# Patient Record
Sex: FEMALE | Race: WHITE | Employment: FULL TIME | URBAN - METROPOLITAN AREA
[De-identification: names, ages, dates, MRNs, and addresses within clinical notes are randomized per-mention and may not be internally consistent; named-entity substitution may affect disease eponyms.]

---

## 2021-07-30 ENCOUNTER — PATIENT OUTREACH (OUTPATIENT)
Dept: CARE COORDINATION | Facility: CLINIC | Age: 36
End: 2021-07-30

## 2021-07-30 DIAGNOSIS — O99.341 OTHER MENTAL DISORDERS COMPLICATING PREGNANCY, FIRST TRIMESTER: Primary | ICD-10-CM

## 2021-07-30 SDOH — HEALTH STABILITY: PHYSICAL HEALTH: ON AVERAGE, HOW MANY MINUTES DO YOU ENGAGE IN EXERCISE AT THIS LEVEL?: 0 MIN

## 2021-07-30 SDOH — ECONOMIC STABILITY: TRANSPORTATION INSECURITY
IN THE PAST 12 MONTHS, HAS THE LACK OF TRANSPORTATION KEPT YOU FROM MEDICAL APPOINTMENTS OR FROM GETTING MEDICATIONS?: NO

## 2021-07-30 SDOH — ECONOMIC STABILITY: FOOD INSECURITY: WITHIN THE PAST 12 MONTHS, YOU WORRIED THAT YOUR FOOD WOULD RUN OUT BEFORE YOU GOT MONEY TO BUY MORE.: NEVER TRUE

## 2021-07-30 SDOH — HEALTH STABILITY: PHYSICAL HEALTH: ON AVERAGE, HOW MANY DAYS PER WEEK DO YOU ENGAGE IN MODERATE TO STRENUOUS EXERCISE (LIKE A BRISK WALK)?: 0 DAYS

## 2021-07-30 SDOH — ECONOMIC STABILITY: FOOD INSECURITY: WITHIN THE PAST 12 MONTHS, THE FOOD YOU BOUGHT JUST DIDN'T LAST AND YOU DIDN'T HAVE MONEY TO GET MORE.: NEVER TRUE

## 2021-07-30 SDOH — ECONOMIC STABILITY: TRANSPORTATION INSECURITY
IN THE PAST 12 MONTHS, HAS LACK OF TRANSPORTATION KEPT YOU FROM MEETINGS, WORK, OR FROM GETTING THINGS NEEDED FOR DAILY LIVING?: NO

## 2021-07-30 ASSESSMENT — SOCIAL DETERMINANTS OF HEALTH (SDOH): HOW HARD IS IT FOR YOU TO PAY FOR THE VERY BASICS LIKE FOOD, HOUSING, MEDICAL CARE, AND HEATING?: NOT HARD AT ALL

## 2021-07-30 ASSESSMENT — ACTIVITIES OF DAILY LIVING (ADL): DEPENDENT_IADLS:: INDEPENDENT

## 2021-08-24 ENCOUNTER — PATIENT OUTREACH (OUTPATIENT)
Dept: CARE COORDINATION | Facility: CLINIC | Age: 36
End: 2021-08-24

## 2021-10-18 ENCOUNTER — LAB (OUTPATIENT)
Dept: URGENT CARE | Facility: URGENT CARE | Age: 36
End: 2021-10-18
Attending: SPECIALIST
Payer: COMMERCIAL

## 2021-10-18 DIAGNOSIS — Z01.818 PREOPERATIVE EXAMINATION: ICD-10-CM

## 2021-10-18 LAB — SARS-COV-2 RNA RESP QL NAA+PROBE: NEGATIVE

## 2021-10-18 PROCEDURE — U0005 INFEC AGEN DETEC AMPLI PROBE: HCPCS

## 2021-10-18 PROCEDURE — U0003 INFECTIOUS AGENT DETECTION BY NUCLEIC ACID (DNA OR RNA); SEVERE ACUTE RESPIRATORY SYNDROME CORONAVIRUS 2 (SARS-COV-2) (CORONAVIRUS DISEASE [COVID-19]), AMPLIFIED PROBE TECHNIQUE, MAKING USE OF HIGH THROUGHPUT TECHNOLOGIES AS DESCRIBED BY CMS-2020-01-R: HCPCS

## 2021-10-21 ENCOUNTER — ANESTHESIA (OUTPATIENT)
Dept: OBGYN | Facility: CLINIC | Age: 36
End: 2021-10-21
Payer: COMMERCIAL

## 2021-10-21 ENCOUNTER — ANESTHESIA EVENT (OUTPATIENT)
Dept: OBGYN | Facility: CLINIC | Age: 36
End: 2021-10-21
Payer: COMMERCIAL

## 2021-10-21 ENCOUNTER — HOSPITAL ENCOUNTER (INPATIENT)
Facility: CLINIC | Age: 36
LOS: 3 days | Discharge: HOME | End: 2021-10-24
Attending: SPECIALIST | Admitting: SPECIALIST
Payer: COMMERCIAL

## 2021-10-21 DIAGNOSIS — O14.93 PRE-ECLAMPSIA IN THIRD TRIMESTER: ICD-10-CM

## 2021-10-21 PROBLEM — O14.90 PREECLAMPSIA: Status: ACTIVE | Noted: 2021-10-21

## 2021-10-21 LAB
ABO/RH(D): NORMAL
AMPHETAMINES UR QL SCN: NORMAL
ANTIBODY SCREEN: NEGATIVE
AST SERPL W P-5'-P-CCNC: 28 U/L (ref 0–45)
BARBITURATES UR QL: NORMAL
BASOPHILS # BLD AUTO: 0 10E3/UL (ref 0–0.2)
BASOPHILS NFR BLD AUTO: 0 %
BENZODIAZ UR QL: NORMAL
CANNABINOIDS UR QL SCN: NORMAL
COCAINE UR QL: NORMAL
CREAT SERPL-MCNC: 0.65 MG/DL (ref 0.52–1.04)
EOSINOPHIL # BLD AUTO: 0.2 10E3/UL (ref 0–0.7)
EOSINOPHIL NFR BLD AUTO: 1 %
ERYTHROCYTE [DISTWIDTH] IN BLOOD BY AUTOMATED COUNT: 13.8 % (ref 10–15)
GFR SERPL CREATININE-BSD FRML MDRD: >90 ML/MIN/1.73M2
HCT VFR BLD AUTO: 33.9 % (ref 35–47)
HGB BLD-MCNC: 11.2 G/DL (ref 11.7–15.7)
IMM GRANULOCYTES # BLD: 0.1 10E3/UL
IMM GRANULOCYTES NFR BLD: 1 %
LYMPHOCYTES # BLD AUTO: 2.2 10E3/UL (ref 0.8–5.3)
LYMPHOCYTES NFR BLD AUTO: 19 %
MCH RBC QN AUTO: 27.6 PG (ref 26.5–33)
MCHC RBC AUTO-ENTMCNC: 33 G/DL (ref 31.5–36.5)
MCV RBC AUTO: 84 FL (ref 78–100)
MONOCYTES # BLD AUTO: 0.8 10E3/UL (ref 0–1.3)
MONOCYTES NFR BLD AUTO: 7 %
NEUTROPHILS # BLD AUTO: 8.4 10E3/UL (ref 1.6–8.3)
NEUTROPHILS NFR BLD AUTO: 72 %
NRBC # BLD AUTO: 0 10E3/UL
NRBC BLD AUTO-RTO: 0 /100
OPIATES UR QL SCN: NORMAL
PCP UR QL SCN: NORMAL
PLATELET # BLD AUTO: 284 10E3/UL (ref 150–450)
PLATELET # BLD AUTO: 292 10E3/UL (ref 150–450)
RBC # BLD AUTO: 4.06 10E6/UL (ref 3.8–5.2)
SPECIMEN EXPIRATION DATE: NORMAL
T PALLIDUM AB SER QL: NONREACTIVE
WBC # BLD AUTO: 11.6 10E3/UL (ref 4–11)

## 2021-10-21 PROCEDURE — 85025 COMPLETE CBC W/AUTO DIFF WBC: CPT | Performed by: SPECIALIST

## 2021-10-21 PROCEDURE — 258N000003 HC RX IP 258 OP 636: Performed by: SPECIALIST

## 2021-10-21 PROCEDURE — 258N000003 HC RX IP 258 OP 636: Performed by: ANESTHESIOLOGY

## 2021-10-21 PROCEDURE — 999N000040 HC STATISTIC CONSULT NO CHARGE VASC ACCESS

## 2021-10-21 PROCEDURE — 250N000013 HC RX MED GY IP 250 OP 250 PS 637: Performed by: SPECIALIST

## 2021-10-21 PROCEDURE — 80307 DRUG TEST PRSMV CHEM ANLYZR: CPT | Performed by: SPECIALIST

## 2021-10-21 PROCEDURE — 250N000011 HC RX IP 250 OP 636: Performed by: SPECIALIST

## 2021-10-21 PROCEDURE — 86900 BLOOD TYPING SEROLOGIC ABO: CPT | Performed by: SPECIALIST

## 2021-10-21 PROCEDURE — 36415 COLL VENOUS BLD VENIPUNCTURE: CPT | Performed by: SPECIALIST

## 2021-10-21 PROCEDURE — 86780 TREPONEMA PALLIDUM: CPT | Performed by: SPECIALIST

## 2021-10-21 PROCEDURE — 370N000003 HC ANESTHESIA WARD SERVICE

## 2021-10-21 PROCEDURE — 250N000009 HC RX 250: Performed by: ANESTHESIOLOGY

## 2021-10-21 PROCEDURE — 120N000001 HC R&B MED SURG/OB

## 2021-10-21 PROCEDURE — 250N000009 HC RX 250: Performed by: SPECIALIST

## 2021-10-21 PROCEDURE — 82565 ASSAY OF CREATININE: CPT | Performed by: SPECIALIST

## 2021-10-21 PROCEDURE — 250N000013 HC RX MED GY IP 250 OP 250 PS 637

## 2021-10-21 PROCEDURE — 250N000011 HC RX IP 250 OP 636: Performed by: ANESTHESIOLOGY

## 2021-10-21 PROCEDURE — 85049 AUTOMATED PLATELET COUNT: CPT | Performed by: SPECIALIST

## 2021-10-21 PROCEDURE — 999N000128 HC STATISTIC PERIPHERAL IV START W/O US GUIDANCE

## 2021-10-21 PROCEDURE — 84450 TRANSFERASE (AST) (SGOT): CPT | Performed by: SPECIALIST

## 2021-10-21 RX ORDER — KETOROLAC TROMETHAMINE 30 MG/ML
30 INJECTION, SOLUTION INTRAMUSCULAR; INTRAVENOUS
Status: DISCONTINUED | OUTPATIENT
Start: 2021-10-21 | End: 2021-10-24 | Stop reason: HOSPADM

## 2021-10-21 RX ORDER — METOCLOPRAMIDE HYDROCHLORIDE 5 MG/ML
10 INJECTION INTRAMUSCULAR; INTRAVENOUS EVERY 6 HOURS PRN
Status: DISCONTINUED | OUTPATIENT
Start: 2021-10-21 | End: 2021-10-22 | Stop reason: HOSPADM

## 2021-10-21 RX ORDER — IBUPROFEN 600 MG/1
600 TABLET, FILM COATED ORAL
Status: DISCONTINUED | OUTPATIENT
Start: 2021-10-21 | End: 2021-10-24 | Stop reason: HOSPADM

## 2021-10-21 RX ORDER — FENTANYL CITRATE 50 UG/ML
100 INJECTION, SOLUTION INTRAMUSCULAR; INTRAVENOUS ONCE
Status: DISCONTINUED | OUTPATIENT
Start: 2021-10-21 | End: 2021-10-22 | Stop reason: HOSPADM

## 2021-10-21 RX ORDER — MISOPROSTOL 100 UG/1
25 TABLET ORAL EVERY 4 HOURS PRN
Status: DISCONTINUED | OUTPATIENT
Start: 2021-10-21 | End: 2021-10-22 | Stop reason: HOSPADM

## 2021-10-21 RX ORDER — FENTANYL CITRATE 50 UG/ML
INJECTION, SOLUTION INTRAMUSCULAR; INTRAVENOUS
Status: COMPLETED | OUTPATIENT
Start: 2021-10-21 | End: 2021-10-21

## 2021-10-21 RX ORDER — MAGNESIUM SULFATE HEPTAHYDRATE 40 MG/ML
2 INJECTION, SOLUTION INTRAVENOUS
Status: DISCONTINUED | OUTPATIENT
Start: 2021-10-21 | End: 2021-10-24 | Stop reason: HOSPADM

## 2021-10-21 RX ORDER — NALOXONE HYDROCHLORIDE 0.4 MG/ML
0.2 INJECTION, SOLUTION INTRAMUSCULAR; INTRAVENOUS; SUBCUTANEOUS
Status: DISCONTINUED | OUTPATIENT
Start: 2021-10-21 | End: 2021-10-22 | Stop reason: HOSPADM

## 2021-10-21 RX ORDER — ESCITALOPRAM OXALATE 20 MG/1
20 TABLET ORAL DAILY
Status: DISCONTINUED | OUTPATIENT
Start: 2021-10-21 | End: 2021-10-24 | Stop reason: HOSPADM

## 2021-10-21 RX ORDER — NIFEDIPINE 10 MG/1
10-20 CAPSULE ORAL
Status: DISCONTINUED | OUTPATIENT
Start: 2021-10-21 | End: 2021-10-24 | Stop reason: HOSPADM

## 2021-10-21 RX ORDER — NALOXONE HYDROCHLORIDE 0.4 MG/ML
0.4 INJECTION, SOLUTION INTRAMUSCULAR; INTRAVENOUS; SUBCUTANEOUS
Status: DISCONTINUED | OUTPATIENT
Start: 2021-10-21 | End: 2021-10-22 | Stop reason: HOSPADM

## 2021-10-21 RX ORDER — ACETAMINOPHEN 325 MG/1
650 TABLET ORAL EVERY 4 HOURS PRN
Status: DISCONTINUED | OUTPATIENT
Start: 2021-10-21 | End: 2021-10-24 | Stop reason: HOSPADM

## 2021-10-21 RX ORDER — PROCHLORPERAZINE MALEATE 5 MG
10 TABLET ORAL EVERY 6 HOURS PRN
Status: DISCONTINUED | OUTPATIENT
Start: 2021-10-21 | End: 2021-10-22 | Stop reason: HOSPADM

## 2021-10-21 RX ORDER — CARBOPROST TROMETHAMINE 250 UG/ML
250 INJECTION, SOLUTION INTRAMUSCULAR
Status: DISCONTINUED | OUTPATIENT
Start: 2021-10-21 | End: 2021-10-22 | Stop reason: HOSPADM

## 2021-10-21 RX ORDER — OXYTOCIN/0.9 % SODIUM CHLORIDE 30/500 ML
340 PLASTIC BAG, INJECTION (ML) INTRAVENOUS CONTINUOUS PRN
Status: DISCONTINUED | OUTPATIENT
Start: 2021-10-21 | End: 2021-10-22 | Stop reason: HOSPADM

## 2021-10-21 RX ORDER — ONDANSETRON 4 MG/1
4 TABLET, ORALLY DISINTEGRATING ORAL EVERY 6 HOURS PRN
Status: DISCONTINUED | OUTPATIENT
Start: 2021-10-21 | End: 2021-10-22 | Stop reason: HOSPADM

## 2021-10-21 RX ORDER — TERBUTALINE SULFATE 1 MG/ML
0.25 INJECTION, SOLUTION SUBCUTANEOUS
Status: DISCONTINUED | OUTPATIENT
Start: 2021-10-21 | End: 2021-10-22 | Stop reason: HOSPADM

## 2021-10-21 RX ORDER — OXYTOCIN 10 [USP'U]/ML
10 INJECTION, SOLUTION INTRAMUSCULAR; INTRAVENOUS
Status: DISCONTINUED | OUTPATIENT
Start: 2021-10-21 | End: 2021-10-24 | Stop reason: HOSPADM

## 2021-10-21 RX ORDER — ROPIVACAINE HYDROCHLORIDE 2 MG/ML
10 INJECTION, SOLUTION EPIDURAL; INFILTRATION; PERINEURAL ONCE
Status: DISCONTINUED | OUTPATIENT
Start: 2021-10-21 | End: 2021-10-22 | Stop reason: HOSPADM

## 2021-10-21 RX ORDER — METHYLERGONOVINE MALEATE 0.2 MG/ML
200 INJECTION INTRAVENOUS
Status: DISCONTINUED | OUTPATIENT
Start: 2021-10-21 | End: 2021-10-22 | Stop reason: HOSPADM

## 2021-10-21 RX ORDER — METOCLOPRAMIDE 10 MG/1
10 TABLET ORAL EVERY 6 HOURS PRN
Status: DISCONTINUED | OUTPATIENT
Start: 2021-10-21 | End: 2021-10-22 | Stop reason: HOSPADM

## 2021-10-21 RX ORDER — PENICILLIN G 3000000 [IU]/50ML
3 INJECTION, SOLUTION INTRAVENOUS EVERY 4 HOURS
Status: DISCONTINUED | OUTPATIENT
Start: 2021-10-21 | End: 2021-10-22 | Stop reason: HOSPADM

## 2021-10-21 RX ORDER — CALCIUM GLUCONATE 94 MG/ML
1 INJECTION, SOLUTION INTRAVENOUS
Status: DISCONTINUED | OUTPATIENT
Start: 2021-10-21 | End: 2021-10-24 | Stop reason: HOSPADM

## 2021-10-21 RX ORDER — PROCHLORPERAZINE 25 MG
25 SUPPOSITORY, RECTAL RECTAL EVERY 12 HOURS PRN
Status: DISCONTINUED | OUTPATIENT
Start: 2021-10-21 | End: 2021-10-22 | Stop reason: HOSPADM

## 2021-10-21 RX ORDER — LIDOCAINE 40 MG/G
CREAM TOPICAL
Status: DISCONTINUED | OUTPATIENT
Start: 2021-10-21 | End: 2021-10-22 | Stop reason: HOSPADM

## 2021-10-21 RX ORDER — OXYTOCIN 10 [USP'U]/ML
10 INJECTION, SOLUTION INTRAMUSCULAR; INTRAVENOUS
Status: DISCONTINUED | OUTPATIENT
Start: 2021-10-21 | End: 2021-10-22 | Stop reason: HOSPADM

## 2021-10-21 RX ORDER — ACETAMINOPHEN 325 MG/1
TABLET ORAL
Status: COMPLETED
Start: 2021-10-21 | End: 2021-10-21

## 2021-10-21 RX ORDER — PANTOPRAZOLE SODIUM 40 MG/1
40 TABLET, DELAYED RELEASE ORAL
Status: DISCONTINUED | OUTPATIENT
Start: 2021-10-21 | End: 2021-10-24 | Stop reason: HOSPADM

## 2021-10-21 RX ORDER — BUPROPION HYDROCHLORIDE 150 MG/1
150 TABLET ORAL DAILY
Status: DISCONTINUED | OUTPATIENT
Start: 2021-10-21 | End: 2021-10-24 | Stop reason: HOSPADM

## 2021-10-21 RX ORDER — OXYTOCIN/0.9 % SODIUM CHLORIDE 30/500 ML
1-24 PLASTIC BAG, INJECTION (ML) INTRAVENOUS CONTINUOUS
Status: DISCONTINUED | OUTPATIENT
Start: 2021-10-21 | End: 2021-10-22 | Stop reason: HOSPADM

## 2021-10-21 RX ORDER — PRENATAL VIT/IRON FUM/FOLIC AC 27MG-0.8MG
1 TABLET ORAL DAILY
COMMUNITY
End: 2022-01-19

## 2021-10-21 RX ORDER — PENICILLIN G POTASSIUM 5000000 [IU]/1
5 INJECTION, POWDER, FOR SOLUTION INTRAMUSCULAR; INTRAVENOUS ONCE
Status: COMPLETED | OUTPATIENT
Start: 2021-10-21 | End: 2021-10-21

## 2021-10-21 RX ORDER — MAGNESIUM SULFATE HEPTAHYDRATE 40 MG/ML
4 INJECTION, SOLUTION INTRAVENOUS ONCE
Status: COMPLETED | OUTPATIENT
Start: 2021-10-21 | End: 2021-10-21

## 2021-10-21 RX ORDER — OXYTOCIN/0.9 % SODIUM CHLORIDE 30/500 ML
100-340 PLASTIC BAG, INJECTION (ML) INTRAVENOUS CONTINUOUS PRN
Status: DISCONTINUED | OUTPATIENT
Start: 2021-10-21 | End: 2021-10-24 | Stop reason: HOSPADM

## 2021-10-21 RX ORDER — FENTANYL CITRATE 50 UG/ML
50-100 INJECTION, SOLUTION INTRAMUSCULAR; INTRAVENOUS
Status: DISCONTINUED | OUTPATIENT
Start: 2021-10-21 | End: 2021-10-22 | Stop reason: HOSPADM

## 2021-10-21 RX ORDER — MAGNESIUM SULFATE IN WATER 40 MG/ML
2 INJECTION, SOLUTION INTRAVENOUS CONTINUOUS
Status: DISCONTINUED | OUTPATIENT
Start: 2021-10-21 | End: 2021-10-24 | Stop reason: HOSPADM

## 2021-10-21 RX ORDER — MAGNESIUM SULFATE HEPTAHYDRATE 40 MG/ML
4 INJECTION, SOLUTION INTRAVENOUS
Status: DISCONTINUED | OUTPATIENT
Start: 2021-10-21 | End: 2021-10-24 | Stop reason: HOSPADM

## 2021-10-21 RX ORDER — ONDANSETRON 2 MG/ML
4 INJECTION INTRAMUSCULAR; INTRAVENOUS EVERY 6 HOURS PRN
Status: DISCONTINUED | OUTPATIENT
Start: 2021-10-21 | End: 2021-10-22 | Stop reason: HOSPADM

## 2021-10-21 RX ORDER — MISOPROSTOL 200 UG/1
400 TABLET ORAL
Status: DISCONTINUED | OUTPATIENT
Start: 2021-10-21 | End: 2021-10-22 | Stop reason: HOSPADM

## 2021-10-21 RX ORDER — ROPIVACAINE HYDROCHLORIDE 2 MG/ML
INJECTION, SOLUTION EPIDURAL; INFILTRATION; PERINEURAL
Status: COMPLETED | OUTPATIENT
Start: 2021-10-21 | End: 2021-10-21

## 2021-10-21 RX ORDER — MISOPROSTOL 200 UG/1
800 TABLET ORAL
Status: DISCONTINUED | OUTPATIENT
Start: 2021-10-21 | End: 2021-10-22 | Stop reason: HOSPADM

## 2021-10-21 RX ORDER — SODIUM CHLORIDE, SODIUM LACTATE, POTASSIUM CHLORIDE, CALCIUM CHLORIDE 600; 310; 30; 20 MG/100ML; MG/100ML; MG/100ML; MG/100ML
10-125 INJECTION, SOLUTION INTRAVENOUS CONTINUOUS
Status: DISCONTINUED | OUTPATIENT
Start: 2021-10-21 | End: 2021-10-24 | Stop reason: HOSPADM

## 2021-10-21 RX ORDER — EPHEDRINE SULFATE 50 MG/ML
5 INJECTION, SOLUTION INTRAMUSCULAR; INTRAVENOUS; SUBCUTANEOUS
Status: DISCONTINUED | OUTPATIENT
Start: 2021-10-21 | End: 2021-10-22 | Stop reason: HOSPADM

## 2021-10-21 RX ORDER — NALBUPHINE HYDROCHLORIDE 10 MG/ML
2.5-5 INJECTION, SOLUTION INTRAMUSCULAR; INTRAVENOUS; SUBCUTANEOUS EVERY 6 HOURS PRN
Status: DISCONTINUED | OUTPATIENT
Start: 2021-10-21 | End: 2021-10-24 | Stop reason: HOSPADM

## 2021-10-21 RX ORDER — TRANEXAMIC ACID 10 MG/ML
1 INJECTION, SOLUTION INTRAVENOUS EVERY 30 MIN PRN
Status: DISCONTINUED | OUTPATIENT
Start: 2021-10-21 | End: 2021-10-22 | Stop reason: HOSPADM

## 2021-10-21 RX ORDER — MAGNESIUM SULFATE HEPTAHYDRATE 500 MG/ML
10 INJECTION, SOLUTION INTRAMUSCULAR; INTRAVENOUS
Status: DISCONTINUED | OUTPATIENT
Start: 2021-10-21 | End: 2021-10-24 | Stop reason: HOSPADM

## 2021-10-21 RX ORDER — LORAZEPAM 2 MG/ML
2 INJECTION INTRAMUSCULAR
Status: DISCONTINUED | OUTPATIENT
Start: 2021-10-21 | End: 2021-10-24 | Stop reason: HOSPADM

## 2021-10-21 RX ORDER — LABETALOL HYDROCHLORIDE 5 MG/ML
20 INJECTION, SOLUTION INTRAVENOUS
Status: COMPLETED | OUTPATIENT
Start: 2021-10-21 | End: 2021-10-21

## 2021-10-21 RX ADMIN — PANTOPRAZOLE SODIUM 40 MG: 40 TABLET, DELAYED RELEASE ORAL at 09:46

## 2021-10-21 RX ADMIN — ESCITALOPRAM OXALATE 20 MG: 20 TABLET ORAL at 09:46

## 2021-10-21 RX ADMIN — NIFEDIPINE 10 MG: 10 CAPSULE, LIQUID FILLED ORAL at 09:00

## 2021-10-21 RX ADMIN — ROPIVACAINE HYDROCHLORIDE 10 ML: 2 INJECTION, SOLUTION EPIDURAL; INFILTRATION at 19:45

## 2021-10-21 RX ADMIN — FENTANYL CITRATE 100 MCG: 50 INJECTION, SOLUTION INTRAMUSCULAR; INTRAVENOUS at 19:45

## 2021-10-21 RX ADMIN — MAGNESIUM SULFATE HEPTAHYDRATE 2 G/HR: 40 INJECTION, SOLUTION INTRAVENOUS at 14:49

## 2021-10-21 RX ADMIN — PENICILLIN G 3 MILLION UNITS: 3000000 INJECTION, SOLUTION INTRAVENOUS at 13:26

## 2021-10-21 RX ADMIN — FENTANYL CITRATE: 50 INJECTION, SOLUTION INTRAMUSCULAR; INTRAVENOUS at 19:44

## 2021-10-21 RX ADMIN — MISOPROSTOL 25 MCG: 100 TABLET ORAL at 09:09

## 2021-10-21 RX ADMIN — Medication 5 MG: at 19:56

## 2021-10-21 RX ADMIN — SODIUM CHLORIDE, POTASSIUM CHLORIDE, SODIUM LACTATE AND CALCIUM CHLORIDE 125 ML/HR: 600; 310; 30; 20 INJECTION, SOLUTION INTRAVENOUS at 13:26

## 2021-10-21 RX ADMIN — LABETALOL HYDROCHLORIDE 20 MG: 5 INJECTION, SOLUTION INTRAVENOUS at 18:56

## 2021-10-21 RX ADMIN — ACETAMINOPHEN 650 MG: 325 TABLET, FILM COATED ORAL at 14:18

## 2021-10-21 RX ADMIN — Medication 5 MG: at 21:17

## 2021-10-21 RX ADMIN — Medication 2 MILLI-UNITS/MIN: at 18:39

## 2021-10-21 RX ADMIN — NIFEDIPINE 10 MG: 10 CAPSULE, LIQUID FILLED ORAL at 13:15

## 2021-10-21 RX ADMIN — FENTANYL CITRATE 10 ML/HR: 50 INJECTION, SOLUTION INTRAMUSCULAR; INTRAVENOUS at 21:05

## 2021-10-21 RX ADMIN — MAGNESIUM SULFATE HEPTAHYDRATE 2 G/HR: 40 INJECTION, SOLUTION INTRAVENOUS at 15:57

## 2021-10-21 RX ADMIN — NIFEDIPINE 10 MG: 10 CAPSULE, LIQUID FILLED ORAL at 15:47

## 2021-10-21 RX ADMIN — MAGNESIUM SULFATE HEPTAHYDRATE 4 G: 40 INJECTION, SOLUTION INTRAVENOUS at 14:19

## 2021-10-21 RX ADMIN — PENICILLIN G POTASSIUM 5 MILLION UNITS: 5000000 POWDER, FOR SOLUTION INTRAMUSCULAR; INTRAPLEURAL; INTRATHECAL; INTRAVENOUS at 17:19

## 2021-10-21 RX ADMIN — SODIUM CHLORIDE, POTASSIUM CHLORIDE, SODIUM LACTATE AND CALCIUM CHLORIDE 75 ML/HR: 600; 310; 30; 20 INJECTION, SOLUTION INTRAVENOUS at 23:34

## 2021-10-21 RX ADMIN — ONDANSETRON 4 MG: 2 INJECTION INTRAMUSCULAR; INTRAVENOUS at 21:13

## 2021-10-21 RX ADMIN — ACETAMINOPHEN 650 MG: 325 TABLET, FILM COATED ORAL at 10:24

## 2021-10-21 RX ADMIN — PENICILLIN G 3 MILLION UNITS: 3000000 INJECTION, SOLUTION INTRAVENOUS at 21:36

## 2021-10-21 RX ADMIN — MISOPROSTOL 25 MCG: 100 TABLET ORAL at 13:07

## 2021-10-21 RX ADMIN — ACETAMINOPHEN 650 MG: 325 TABLET, FILM COATED ORAL at 18:42

## 2021-10-21 RX ADMIN — ACETAMINOPHEN 650 MG: 325 TABLET, FILM COATED ORAL at 23:42

## 2021-10-21 RX ADMIN — BUPROPION HYDROCHLORIDE 150 MG: 150 TABLET, EXTENDED RELEASE ORAL at 09:46

## 2021-10-21 ASSESSMENT — MIFFLIN-ST. JEOR: SCORE: 1549.98

## 2021-10-21 NOTE — PROGRESS NOTES
"OB progress  Catarina Crews    EVENTS:   had several elevated BPs and sever headache so mag sulfate started at  230pm.  Treated with 10mg nifedipine 10mg  at 9, 1315 and 1547.   2nd Cytotec placed at ~1pm, pcn started at that time as well, just received 2nd dose.      S: headache slightly better, rating an \"8.\" Feeling contractions starting to feel them more but still tolearting.O:    O:   Patient Vitals for the past 8 hrs:   BP Temp Temp src Resp SpO2 Height Weight   625pm 149/84         10/21/21 1735 (!) 157 -- -- 16 -- -- --   10/21/21 1718 (!)  -- -- 16 -- -- --   10/21/21 1704 (!) 158 -- -- 16 98 % -- --   10/21/21 1647 (!) 148/76 -- -- -- -- -- --   10/21/21 1629 (!) 164/78 -- -- -- -- -- --   10/21/21 1608 (!) 145/82 -- -- 16 -- -- --   10/21/21 1538 (!) 166/87 *treated  -- -- 16 98 % -- --   10/21/21 1522 (!) 164/86 -- -- 16 97 % -- --   10/21/21 1452 (!) 157/77 -- -- 16 97 % -- --   10/21/21 1449 (!) 156/81 -- -- 16 97 % -- --   10/21/21 1444 (!) 154/82 -- -- 16 96 % -- --   10/21/21 1440 (!) 150/80 -- -- 16 98 % -- --   10/21/21 1434 (!) 148/79 -- -- 16 98 % -- --   10/21/21 1430 (!) 158/81 98.1  F (36.7  C) Temporal -- 98 % -- --   10/21/21 1429 (!) 158/ -- -- 16 100 % -- --   10/21/21 1424 (!) 168/83 -- -- 16 -- 1.524 m (5') 94.3 kg (208 lb)   10/21/21 1417 (!) 146/79 -- -- 16 -- -- --   10/21/21 1357 (!) 155/83 -- -- 16 -- -- --   10/21/21 1338 (!) 157/89 -- -- 16 -- -- --   10/21/21 1313 (!) 180/100  *treated -- -- 16 -- -- --   10/21/21 1256 (!) 163/87 97.5  F (36.4  C) Temporal 16 -- -- --     AAOxe, initially laying with washcloth over eyes. But overall appears well  Cervix /-2 soft anterior  AROM at 625pm, copious clear fluid  FHT: 155, moderate variability, occ variable decel  TOCO q4-6 minutes    plt 292  hgb 11.2  Cr 0.65  AST 28      A: 36 year old  @ 37w1d IOL for pre-eclampsia, now with sever features of headsche snd sever range bps today.  Has ripened cervix some " today, now s/p AROM  GBS +, s/p 2 doses pcn    P: will start pitocin  Plans epidural soon  Cont to treat Ps >160/100    Electronically signed by  Bia Retana DO  6:34 PM  10/21/2021  [facility]

## 2021-10-21 NOTE — PROVIDER NOTIFICATION
"Dr Retana paged, \"Patient c/o headache. Would you be willing to order Tylenol prn? Thanks so much!\"    Return call from Dr Retana, orders received.   "
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
## 2021-10-21 NOTE — PROVIDER NOTIFICATION
"Dr Retana paged with update, \"163/87 and 180/100, oral procardia given. Recheck BP post treatment in 5 minutes. SVE with cytotec placement 1-2/50/-3. Thanks! Tina GARCIA\". Awaiting return call/new orders.     Addendum: Dr Retana paged with update post treatment /89.    Addendum 1404: return call from Dr Retana. Will start treatment with magnesium now with 4 gm load along with 2 gm per hour, TORB. Dr Retana planning on checking pt after clinic.   "

## 2021-10-21 NOTE — PROVIDER NOTIFICATION
Dr Retana paged to discuss BP medication treatment in the setting of severe range BPs. Awaiting return call.     MD updated on BPs. Pt given procardia. Will monitor BP and give labetalol if BP continues to be in severe range BP.

## 2021-10-21 NOTE — H&P
OB ADMIT  Catarina Crews    CC: pre-eclampsia without severe features  HPI: Catarina Crews is a 36 year old  @ 37+1 weeks by 6+6w US presents for labor induction due to pre-elclampsia without severe features. She's had gestational hypertension in both  prior pregnancies and has been on baby asa this preg. This pregnancy her BPs were normal until 35+ weeks when she had was 130/90s in the office. BP was 146/100 a few days later, and 140/90s earlier this week. She had a PCR on 10/11/21 that was 413, confirming diagnosis of pre-eclampsia. Her HELLP labs have been normal 2x (10/4 and 10/1) and she hasn't had any symptoms other than increased swelling in the last week.   She is GBS positive. Covid test negative 3 days ago.     ROS: -lof, -vb, occ ha  Prenatal care with Dr Retana since 11 weeks. 165# (11w) -> 209#  Flu shot and tdap given this pregnancy.   Covid vaccine in July - J&J    OB Risks:   ~Covid -19 + 2021. Has been on baby ASA  ~H/o meth and etoh and tob abuse. Stopped tobacco with +upt. Had meth relapse , and again 7/10 at 22 weeks. SOBER SINCE THEN. Then Started in-pt at [facility] x 1 mo, and out pt treatment since then through , now doing online and doing very well. 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 33 weeks: L 8.8mm, R 6.5mm, needs post- f/u -will  notify peds at [facility]  ~AMA - nl nipt    Prenatal labs: O+, Ab negative, rubella immune, VDRL NR, HepBsAg neg, HIV neg, GBS +, genetic screening nl nipt- girl!, GCT 98.  HGB  @ 35 week  10/4: ast 19, cr 0.59, plt 310  10/11 18, 0.69, 281.  .  PMHx:   Anxiety , Depression, on meds  HSV- no recent outbreak  GERD - tried tums, pepcid, now on omeprazole  COVID-19+ 21  Pyelonephritis     PSxHx:   wisdom teeth  Past  "OB:  2010-  40 weeks, 6#8, htn just prior to del \"Prince\"  2015-  37 week iol  iugr/ sga 4#11oz \"Chiquita\"  Meds:  PNV  ASA 81mg  Vatrex 500 bid  Wellbutrin XL 150mg  Escitalopram 20mg  Omeprazole 20mg    All: Adderall  Soc:  to Balaji, Negative tobacco, etoh, drugs since 7/10/21.     PExam on 10/17/21:   209# 140/94, 140/90.   Gen: A&O x 3, NAD   CV reg  Resp: no non labored breathing  Abd: Gravid, NT, EFW 6.5#  Extrems: nt  +1 edema  VE: ft/40/-3  Vertex on US on 10/18    FHT: reactive no decel (only on a few minutes so far)  Donnelly: x1      IMPRESSION:  36 year old  @ 37+1 with pre-eclampsia based on persistently elevated bps >140/90 and elevated PCR. Has had nl HELLP labs.   Unfavorable cervix  GBS: pos    PLAN:  Admit to L&D, T&S, HELLP labs ordred.   Reveiwed rational for induction, and risk of severe BPs  Will tart with cytotec, edmonds if able. Dicussed can be long process to get into active labor but once active usually faster since has hadSVD x 2 prior.   Will need mag/ meds if gets severe-range BPs.       Electronically signed by  Bia Retana DO  8:33 AM  10/21/2021  [facility]      "

## 2021-10-21 NOTE — PROVIDER NOTIFICATION
"Dr Retana at bedside at time of elevated /91. Per MD, if second BP elevated, will procardia. Second /89, Procardia given.     Dr Retana paged with update, \"2nd BP also elevated above 160. 10mg procardia given with good results, /73. Cytotec in- I was able to get my finger all the way through now, 1/40/-3. Thanks!\"    Return call from Dr Retana. Will continue to monitor BPs closely and alert MD with any further severe BP range BPs.   "

## 2021-10-21 NOTE — PROVIDER NOTIFICATION
"Dr Retana paged with update, \"Millie every 3-5- palpate mild. FHTs with baseline , moderate, +accels, had a few late and variable decels throughout process. Headache unchanged. BP in severe range again despite resting. Awaiting 2nd BP. Please advise on plan. Thanks!!\"    Return call from Dr Retana. Plan to treat with procardia if 2nd BP elevated. Continue with vaginal cytotec. Will text provider with updates on BP and SVE.   "

## 2021-10-22 PROCEDURE — 258N000003 HC RX IP 258 OP 636: Performed by: SPECIALIST

## 2021-10-22 PROCEDURE — 250N000011 HC RX IP 250 OP 636: Performed by: SPECIALIST

## 2021-10-22 PROCEDURE — 00HU33Z INSERTION OF INFUSION DEVICE INTO SPINAL CANAL, PERCUTANEOUS APPROACH: ICD-10-PCS | Performed by: ANESTHESIOLOGY

## 2021-10-22 PROCEDURE — 250N000013 HC RX MED GY IP 250 OP 250 PS 637: Performed by: SPECIALIST

## 2021-10-22 PROCEDURE — 3E0R3BZ INTRODUCTION OF ANESTHETIC AGENT INTO SPINAL CANAL, PERCUTANEOUS APPROACH: ICD-10-PCS | Performed by: ANESTHESIOLOGY

## 2021-10-22 PROCEDURE — 722N000001 HC LABOR CARE VAGINAL DELIVERY SINGLE

## 2021-10-22 PROCEDURE — 3E033VJ INTRODUCTION OF OTHER HORMONE INTO PERIPHERAL VEIN, PERCUTANEOUS APPROACH: ICD-10-PCS | Performed by: SPECIALIST

## 2021-10-22 PROCEDURE — 250N000009 HC RX 250: Performed by: SPECIALIST

## 2021-10-22 PROCEDURE — 120N000012 HC R&B POSTPARTUM

## 2021-10-22 PROCEDURE — 10907ZC DRAINAGE OF AMNIOTIC FLUID, THERAPEUTIC FROM PRODUCTS OF CONCEPTION, VIA NATURAL OR ARTIFICIAL OPENING: ICD-10-PCS | Performed by: SPECIALIST

## 2021-10-22 PROCEDURE — 3E0P7VZ INTRODUCTION OF HORMONE INTO FEMALE REPRODUCTIVE, VIA NATURAL OR ARTIFICIAL OPENING: ICD-10-PCS | Performed by: SPECIALIST

## 2021-10-22 RX ORDER — HYDROCORTISONE 2.5 %
CREAM (GRAM) TOPICAL 3 TIMES DAILY PRN
Status: DISCONTINUED | OUTPATIENT
Start: 2021-10-22 | End: 2021-10-24 | Stop reason: HOSPADM

## 2021-10-22 RX ORDER — METHYLERGONOVINE MALEATE 0.2 MG/ML
200 INJECTION INTRAVENOUS
Status: DISCONTINUED | OUTPATIENT
Start: 2021-10-22 | End: 2021-10-24 | Stop reason: HOSPADM

## 2021-10-22 RX ORDER — IBUPROFEN 400 MG/1
800 TABLET, FILM COATED ORAL EVERY 6 HOURS PRN
Status: DISCONTINUED | OUTPATIENT
Start: 2021-10-22 | End: 2021-10-24 | Stop reason: HOSPADM

## 2021-10-22 RX ORDER — DOCUSATE SODIUM 100 MG/1
100 CAPSULE, LIQUID FILLED ORAL DAILY
Status: DISCONTINUED | OUTPATIENT
Start: 2021-10-22 | End: 2021-10-24 | Stop reason: HOSPADM

## 2021-10-22 RX ORDER — CARBOPROST TROMETHAMINE 250 UG/ML
250 INJECTION, SOLUTION INTRAMUSCULAR
Status: DISCONTINUED | OUTPATIENT
Start: 2021-10-22 | End: 2021-10-24 | Stop reason: HOSPADM

## 2021-10-22 RX ORDER — LABETALOL 200 MG/1
200 TABLET, FILM COATED ORAL EVERY 8 HOURS SCHEDULED
Status: DISCONTINUED | OUTPATIENT
Start: 2021-10-22 | End: 2021-10-24 | Stop reason: HOSPADM

## 2021-10-22 RX ORDER — ACETAMINOPHEN 325 MG/1
650 TABLET ORAL EVERY 4 HOURS PRN
Status: DISCONTINUED | OUTPATIENT
Start: 2021-10-22 | End: 2021-10-24 | Stop reason: HOSPADM

## 2021-10-22 RX ORDER — MISOPROSTOL 200 UG/1
800 TABLET ORAL
Status: DISCONTINUED | OUTPATIENT
Start: 2021-10-22 | End: 2021-10-24 | Stop reason: HOSPADM

## 2021-10-22 RX ORDER — BISACODYL 10 MG
10 SUPPOSITORY, RECTAL RECTAL DAILY PRN
Status: DISCONTINUED | OUTPATIENT
Start: 2021-10-22 | End: 2021-10-24 | Stop reason: HOSPADM

## 2021-10-22 RX ORDER — MODIFIED LANOLIN
OINTMENT (GRAM) TOPICAL
Status: DISCONTINUED | OUTPATIENT
Start: 2021-10-22 | End: 2021-10-24 | Stop reason: HOSPADM

## 2021-10-22 RX ORDER — MAGNESIUM SULFATE IN WATER 40 MG/ML
2 INJECTION, SOLUTION INTRAVENOUS CONTINUOUS
Status: DISCONTINUED | OUTPATIENT
Start: 2021-10-22 | End: 2021-10-22

## 2021-10-22 RX ORDER — OXYTOCIN 10 [USP'U]/ML
10 INJECTION, SOLUTION INTRAMUSCULAR; INTRAVENOUS
Status: DISCONTINUED | OUTPATIENT
Start: 2021-10-22 | End: 2021-10-24 | Stop reason: HOSPADM

## 2021-10-22 RX ORDER — MISOPROSTOL 200 UG/1
400 TABLET ORAL
Status: DISCONTINUED | OUTPATIENT
Start: 2021-10-22 | End: 2021-10-24 | Stop reason: HOSPADM

## 2021-10-22 RX ORDER — OXYTOCIN/0.9 % SODIUM CHLORIDE 30/500 ML
340 PLASTIC BAG, INJECTION (ML) INTRAVENOUS CONTINUOUS PRN
Status: COMPLETED | OUTPATIENT
Start: 2021-10-22 | End: 2021-10-22

## 2021-10-22 RX ORDER — TRANEXAMIC ACID 10 MG/ML
1 INJECTION, SOLUTION INTRAVENOUS EVERY 30 MIN PRN
Status: DISCONTINUED | OUTPATIENT
Start: 2021-10-22 | End: 2021-10-24 | Stop reason: HOSPADM

## 2021-10-22 RX ADMIN — LABETALOL HYDROCHLORIDE 200 MG: 200 TABLET, FILM COATED ORAL at 08:01

## 2021-10-22 RX ADMIN — IBUPROFEN 800 MG: 400 TABLET ORAL at 08:52

## 2021-10-22 RX ADMIN — MAGNESIUM SULFATE HEPTAHYDRATE 2 G/HR: 40 INJECTION, SOLUTION INTRAVENOUS at 12:58

## 2021-10-22 RX ADMIN — BUPROPION HYDROCHLORIDE 150 MG: 150 TABLET, EXTENDED RELEASE ORAL at 08:01

## 2021-10-22 RX ADMIN — ACETAMINOPHEN 650 MG: 325 TABLET, FILM COATED ORAL at 08:52

## 2021-10-22 RX ADMIN — MAGNESIUM SULFATE HEPTAHYDRATE 2 G/HR: 40 INJECTION, SOLUTION INTRAVENOUS at 02:33

## 2021-10-22 RX ADMIN — Medication 100 ML/HR: at 05:32

## 2021-10-22 RX ADMIN — DOCUSATE SODIUM 100 MG: 100 CAPSULE, LIQUID FILLED ORAL at 08:01

## 2021-10-22 RX ADMIN — ACETAMINOPHEN 650 MG: 325 TABLET, FILM COATED ORAL at 15:23

## 2021-10-22 RX ADMIN — ESCITALOPRAM OXALATE 20 MG: 20 TABLET ORAL at 08:01

## 2021-10-22 RX ADMIN — IBUPROFEN 800 MG: 400 TABLET ORAL at 15:23

## 2021-10-22 RX ADMIN — IBUPROFEN 600 MG: 600 TABLET ORAL at 02:52

## 2021-10-22 RX ADMIN — MAGNESIUM SULFATE HEPTAHYDRATE 2 G/HR: 40 INJECTION, SOLUTION INTRAVENOUS at 02:43

## 2021-10-22 RX ADMIN — ACETAMINOPHEN 650 MG: 325 TABLET, FILM COATED ORAL at 21:05

## 2021-10-22 RX ADMIN — LABETALOL HYDROCHLORIDE 200 MG: 200 TABLET, FILM COATED ORAL at 21:05

## 2021-10-22 RX ADMIN — MISOPROSTOL 800 MCG: 200 TABLET ORAL at 02:05

## 2021-10-22 RX ADMIN — IBUPROFEN 800 MG: 400 TABLET ORAL at 21:05

## 2021-10-22 RX ADMIN — SODIUM CHLORIDE, POTASSIUM CHLORIDE, SODIUM LACTATE AND CALCIUM CHLORIDE 75 ML/HR: 600; 310; 30; 20 INJECTION, SOLUTION INTRAVENOUS at 12:57

## 2021-10-22 RX ADMIN — PANTOPRAZOLE SODIUM 40 MG: 40 TABLET, DELAYED RELEASE ORAL at 08:07

## 2021-10-22 RX ADMIN — Medication 340 ML/HR: at 01:59

## 2021-10-22 RX ADMIN — LABETALOL HYDROCHLORIDE 200 MG: 200 TABLET, FILM COATED ORAL at 14:24

## 2021-10-22 RX ADMIN — PENICILLIN G 3 MILLION UNITS: 3000000 INJECTION, SOLUTION INTRAVENOUS at 01:34

## 2021-10-22 RX ADMIN — MAGNESIUM SULFATE HEPTAHYDRATE 2 G/HR: 40 INJECTION, SOLUTION INTRAVENOUS at 23:56

## 2021-10-22 ASSESSMENT — ACTIVITIES OF DAILY LIVING (ADL)
ADLS_ACUITY_SCORE: 3
ADLS_ACUITY_SCORE: 5
ADLS_ACUITY_SCORE: 5
ADLS_ACUITY_SCORE: 3
ADLS_ACUITY_SCORE: 5
ADLS_ACUITY_SCORE: 5
ADLS_ACUITY_SCORE: 3
ADLS_ACUITY_SCORE: 3
ADLS_ACUITY_SCORE: 5
ADLS_ACUITY_SCORE: 3
ADLS_ACUITY_SCORE: 5
ADLS_ACUITY_SCORE: 3

## 2021-10-22 ASSESSMENT — MIFFLIN-ST. JEOR: SCORE: 1531.84

## 2021-10-22 NOTE — PROGRESS NOTES
Postpartum Day 0: Vaginal Delivery- del   Pre-eclampsia with severe features    S:The patient feels better today. Headache is now mild. Still tired on mag.   Pain is well controlled with current medications.  Bottom feels fine . She is  ambulating,  voiding without difficulty. The baby is well in nursery watching blood sugars and she is bottle feeding. Bleeding is  moderate. The patient has no emotional concerns. Denies chest pain, shortness of breath, nausea or vomitting.  Started labetalol 200 tid this am as BPs 150s overnight. .     O:  Patient Vitals for the past 8 hrs:   BP Temp Temp src Pulse Resp Weight   10/22/21 0848 138/73 97.5  F (36.4  C) Oral 67 16 --   10/22/21 0800 (!) 140/80 97.6  F (36.4  C) Axillary 68 16 --   10/22/21 0647 120/54 -- -- 65 16 --   10/22/21 0600 -- -- -- -- 16 --   10/22/21 0500 (!) 159/97 97.7  F (36.5  C) Oral -- 16 92.5 kg (204 lb)   10/22/21 0443 (!) 150/83 -- -- -- -- --   10/22/21 0413 (!) 153/93 -- -- -- -- --   10/22/21 0358 (!) 148/86 -- -- -- -- --   10/22/21 0343 (!) 154/84 -- -- -- -- --   10/22/21 0328 (!) 155/83 -- -- -- -- --   10/22/21 0316 (!) 141/82 -- -- -- -- --   10/22/21 0313 (!) 163/83 -- -- -- -- --   10/22/21 0243 136/73 -- -- -- -- --   10/22/21 0228 (!) 142/79 -- -- -- -- --   10/22/21 0213 136/84 -- -- -- -- --   10/22/21 0158 (!) 142/98 -- -- -- -- --   10/22/21 0143 (!) 138/91 -- -- -- -- --   10/22/21 0129 (!) 134/90 -- -- -- -- --   10/22/21 0113 131/75 -- -- -- -- --     Genl: AAOx3m NAD  Abd: Soft, NT, Fundus firm  Ext: NT, tr edema    UO -1200 overnight    Impression:36 year old y.o.  PPD#0 s/p . iol for pre-eclampsia, developed severe features during induction. Severe bps treated 3x during labor and headache  Headache now improved  Labetalol 200 tid started this am.      Plan: Continue current care.  Labetalol -cont current dose for now. If >140/90s will increase to 300 tid, then would add nifedipine 30xl.   D/w pt will discharge when  <150/100 (ideally <!40/90s) x ~24 hours. May take a few extra days.   Will discontinue magnesium this evening.     Electronically signed by  Bia Retana DO  9:05 AM  10/22/2021  [facility]

## 2021-10-22 NOTE — PLAN OF CARE
Patient was oriented to the room and baby safety and safe baby sleep.  was not in the room.  Magnesium was infusing, pitocin and LR  goal rate of 125 total fluids. Patient complains of a HA but her vision is clear at this time. BP elevated but within parameters. Will be started on PO Labetalol BID. Patient has history of anxiety and is on Lexapro and Wellbutrin. She is 100 days drug free. Urine test  was negative.  Taking Tylenol and Ibuprofen for pain control.

## 2021-10-22 NOTE — PROGRESS NOTES
OB  Catarina Crews    S: comfortable with epidural but still has headache. Getting tylenol q 4 hours    O:   Patient Vitals for the past 4 hrs:   BP Temp SpO2   10/21/21 2346 127/76 -- --   10/21/21 2313 (!) 144/86 -- --   10/21/21 2258 139/83 -- --   10/21/21 2243 134/76 -- --   10/21/21 2232 -- 98.4  F (36.9  C) --   10/21/21 2229 136/81 -- --   10/21/21 2214 133/79 -- --   10/21/21 2158 (!) 144/72 -- --   10/21/21 2143 137/68 -- --   10/21/21 2129 (!) 149/70 -- --   10/21/21 2115 121/76 -- 94 %   10/21/21 2113 115/65 -- --   10/21/21 2057 139/80 -- --   10/21/21 2043 138/81 -- --   10/21/21 2025 (!) 141/76 -- 97 %   10/21/21 2022 (!) 141/78 -- --   10/21/21 2020 132/76 -- 94 %   10/21/21 2019 132/76 -- --   10/21/21 2016 131/74 -- --   10/21/21 2013 137/75 -- 94 %   10/21/21 2012 129/70 -- --   10/21/21 2010 124/73 -- 98 %     Cervix per RN at 845pm: 3/70/-3.  , moderate variability, no recent decels.   Around 8 pm post-epidural had run of decels,  IUPC placed to help with timing of these.   Ctx q 3 in now  Pitocin stopped for a bit, restarted at 930pm. Currently at 6 mu/min    A: 36 year old  @ 37w2d IOL fro pre-eclampsia, has developed severe features since admission with headache and severe range BPs. (bps treated 3x total today). On magnesium sulfate.   Not yet in active phase  GBS+ - treated    P: continue pit as fetus tolerted- increasing now again.   Recheck in a few hours or prn pressure.   Anticipate     Electronically signed by  Bia Retana DO  12:21 AM  10/22/2021  [facility]

## 2021-10-22 NOTE — ANESTHESIA POSTPROCEDURE EVALUATION
Patient: Catarina Crews    Procedure: * No procedures listed *       Diagnosis:* No pre-op diagnosis entered *  Diagnosis Additional Information: No value filed.    Anesthesia Type:  Epidural    Note:  Disposition: Outpatient   Postop Pain Control: Uneventful            Sign Out: Well controlled pain   PONV: No   Neuro/Psych: Uneventful            Sign Out: Acceptable/Baseline neuro status   Airway/Respiratory: Uneventful            Sign Out: Acceptable/Baseline resp. status   CV/Hemodynamics: Uneventful            Sign Out: Acceptable CV status; No obvious hypovolemia; No obvious fluid overload   Other NRE: NONE   DID A NON-ROUTINE EVENT OCCUR? No    Event details/Postop Comments:  The patient denies numbness, weakness, or tingling in either of the lower extremities; denies positional headache; and denies significant back pain. The patient was then counseled on any potential concerning symptoms and if/when to reach out for further guidance. They expressed understanding of the instructions and felt comfortable with the plan.           Last vitals:  Vitals Value Taken Time   BP     Temp     Pulse     Resp     SpO2         Electronically Signed By: Cholo Johnson MD  October 22, 2021  4:38 PM

## 2021-10-22 NOTE — L&D DELIVERY NOTE
OB Vaginal Delivery Note    Catarina Crews MRN# [id number]   Age: 36 year old YOB: 1985     Primary OB doctor: Bia Retana    GA: 37w2d  GP:   Labor Complications: Preeclampsia/Hypertension   EBL: 50  mL  Delivery QBL:    Delivery Type: Vaginal, Spontaneous   ROM to Delivery Time: (Delivered) Hours: 7 Minutes: 35  [place] Weight: 2.466 kg (5 lb 7 oz)    1 Minute 5 Minute 10 Minute   Apgar Totals: 2   7   9          Delivery Details:  This 36 year old   @37w2d presented for labor induction on 10/21/21 due to preeclampsia without sever features. However she developed severe range BPs during labor and a severe headache so magnesium sulfate was eventually initiated. Bps were treated 3 separate times with 10mg nifedipine.  PCN given for GBS prophylaxis, adequately treated. She received 2 doses of cytotec then AROM was done at 622pm and pitocin started. Pt received  epidural for pain relief.  After this pitocin was stopped as she began having repetitive late and variable decels. This eventually resolved and pitocin was restarted. Progressed to complete at 140am.  Patient pushed over 1 contractions to deliver a viable female infant in OA position at 0157 on 10/22/2021. Nuchal cord was reduced and shoulders delivered without difficulty. Infant placed on mother's abdomen and attended to by waiting nursing staff. Baby had a slow heart rate and not vigorous so cord was clamped and cut and handed off to waiting NNP team. Pitocin started then placenta spontaneously delivered at 0201 intact with 3VC. Inspection revealed no lacerations. 800mcg cytotec placed rectally as PPH prophylaxis since she is on mag sulfate. She firmed up very quickly and had minimal blood loss. Mother and baby girl stable. Baby stayed with mother.     Plan to continue magnesium for ~4 hours, and if BPs remain elevated will start po meds.        Mars, Female-Catarina [[id number]]    Labor Event Times    Labor onset  date: 10/21/21 Onset time:  6:55 PM   Dilation complete date: 10/22/21 Complete time:  1:40 AM   Start pushing date/time: 10/22/2021 0153      Labor Length    1st Stage (hrs): 6 (min): 45   2nd Stage (hrs): 0 (min): 17   3rd Stage (hrs): 0 (min): 4      Labor Events     labor?: No   steroids: None  Labor Type: Induction/Cervical ripening, AROM  Predominate monitoring during 1st stage: continuous electronic fetal monitoring     Antibiotics received during labor?: Yes  Reason for Antibiotics: GBS  Antibiotics received for GBS: Penicillin  Antibiotics Given (GBS): Greater than 4 hours prior to delivery     Rupture date/time: 10/21/21 182   Rupture type: Artificial Rupture of Membranes  Fluid color: Clear  Fluid odor: Normal     Induction: Misoprostol, Oxytocin, AROM  Induction date/time: 10/21/21 183   Cervical ripening date/time: 10/21/21 0909   Indications for induction: Mild Preeclampsia     Augmentation: Oxytocin, AROM  Indications for augmentation: Preeclampsia  1:1 continuous labor support provided by?: RN Labor partogram used?: no      Delivery/Placenta Date and Time    Delivery Date: 10/22/21 Delivery Time:  1:57 AM   Placenta Date/Time: 10/22/2021  2:01 AM  Oxytocin given at the time of delivery: after delivery of baby  Delivering clinician: Bia Retana DO          Apgars    Living status: Living   1 Minute 5 Minute 10 Minute 15 Minute 20 Minute   Skin color: 0  1  2      Heart rate: 1  2  2      Reflex irritability: 1  1  2      Muscle tone: 0  1  1      Respiratory effort: 0  2  2      Total: 2  7  9      Apgars assigned by: FLORES HARLEY CNP,APRN     Cord    Vessels: 3 Vessels    Cord Complications: Nuchal   Nuchal Intervention: reduced         Nuchal cord description: loose nuchal cord         Cord Blood Disposition: Lab    Gases Sent?: No    Delayed cord clamping?: No    Stem cell collection?: No        Resuscitation    Methods: Suctioning, Oxygen, Yandel Puff,  "Oximetry   Care at Delivery: Called by Dr. Retana secondary to magnesium on board mom.  with CAN x1 no respiratory effort after delivery cord clamped and cut early. Infant brought to warmer, still no respiratory effort and HR was < 100 in spite of vigorous stimulation. PPV with neopuff for approximately 45 seconds with oxygen and then blow by oxygen for about 1 minute. Infant taking shallow breaths but saturations and heart rate and normal. Most likely sleepy from maternal magnesium treatment.  Follow breathing and color closely in first hour to assure she is transitioning without difficulty.  Katy Armendariz CNP, APRN  10/22/21 0214      Measurements    Weight: 5 lb 7 oz Length: 1' 6\"   Head circumference: 33 cm       Labor Events and Shoulder Dystocia    Fetal Tracing Prior to Delivery: Category 2  Fetal Tracing Comments: moderate vairabilty with variable and late decels  Shoulder dystocia present?: Neg     Delivery (Maternal) (Provider to Complete) ([id number])    Episiotomy: None  Perineal lacerations: None    Est. blood loss (mL): 50  Repair suture: None  Genital tract inspection done: Pos     Blood Loss  Mother: Catarina Crews #[id number]   Start of Mother's Information    Delivery Blood Loss  10/21/21 1855 - 10/22/21 0229    EBL (mL) Hospital Encounter 50 mL    Total  50 mL         End of Mother's Information  Mother: Catarina Crews #[id number]          Delivery - Provider to Complete ([id number])    Delivering clinician: Bia Retana DO  Attempted Delivery Types (Choose all that apply): Spontaneous Vaginal Delivery  Delivery Type (Choose the 1 that will go to the Birth History): Vaginal, Spontaneous                                 Placenta    Date/Time: 10/22/2021  2:01 AM  Removal: Spontaneous  Disposition: Hospital disposal           Anesthesia    Method: Epidural  Cervical dilation at placement: 0-3                Presentation and Position    Presentation: Vertex    Position: " Right Occiput Anterior               Electronically signed by  Bia Retana DO  2:37 AM  10/22/2021  [facility]

## 2021-10-22 NOTE — PLAN OF CARE
Fundus firm and bleeding wnl.  VSS.  Reflexes WNL, no clonus. Denies symptoms of magnesium toxicity. Voiding without difficulty.  Taking tylenol and ibuprofen with good relief.  Up independently.  Using ice and tucks.  Encouraged to call with questions or concerns.

## 2021-10-22 NOTE — ANESTHESIA PROCEDURE NOTES
Epidural catheter Procedure Note    Pre-Procedure   Staff -        Anesthesiologist:  Danny Cooper MD       Performed By: anesthesiologist       Location: OB       Procedure Start/Stop Times: 10/21/2021 7:25 PM and 10/21/2021 7:47 PM       Pre-Anesthestic Checklist: patient identified, IV checked, site marked, risks and benefits discussed, informed consent, monitors and equipment checked, pre-op evaluation, at physician/surgeon's request and post-op pain management  Timeout:       Correct Patient: Yes        Correct Procedure: Yes        Correct Site: Yes        Correct Position: Yes   Procedure Documentation  Procedure: epidural catheter       Diagnosis: Labor Pain       Patient Position: sitting       Patient Prep/Sterile Barriers: sterile gloves, mask, patient draped       Skin prep: Betadine       Local skin infiltrated with 4 mL of 1% lidocaine.        Insertion Site: L2-3. (midline approach).       Technique: LORT saline        ETHAN at 6.5 cm.       Needle Type: [employer] needle       Needle Gauge: 17.        Needle Length (Inches): 3.5        Catheter: 19 G.         Catheter threaded easily.         4 cm epidural space.         Threaded 10.5 cm at skin.         # of attempts: 1 and  # of redirects:  0    Assessment/Narrative         Paresthesias: No.       Test dose of 3 mL lidocaine 1.5% w/ 1:200,000 epinephrine at 19:42 CDT.         Test dose negative, 3 minutes after injection, for signs of intravascular, subdural, or intrathecal injection.       Insertion/Infusion Method: LORT saline       Aspiration negative for Heme or CSF via Epidural Catheter.    Medication(s) Administered   0.2% Ropivacaine (Epidural), 10 mL  Fentanyl PF (Epidural), 100 mcg  Medication Administration Time: 10/21/2021 7:45 PM     Comments:  Patient tolerated procedure well

## 2021-10-22 NOTE — CONSULTS
"[facility]  MATERNAL CHILD HEALTH   INITIAL PSYCHOSOCIAL ASSESSMENT     DATA:     Reason for Social Work Consult: Hx of substance use.    Presenting Information: SANTOS gave birth to third infant and tested positive for amphetamines in July 2021 when she was 22 weeks pregnant.    Living Situation: SANTOS and MARTINA live in a house together with their now 3 children    Social Support: SANTOS stated she has good support from MARTINA and her mother.     Employment: SANTOS is not currently employed. She plans to go back eventually, but is continuing treatment currently. MARTINA is employed and will not be taking leave. .    Insurance: SANTOS tated they have no insurance concerns.    Source of Financial Support: MARTINA is employed and have no financial concerns.     Mental Health History: SANTOS reports having a history of depression and anxiety. She stated she is currently on medications and sees a counselor through her chemical dependency program     History of Postpartum Mood Disorders: SANTOS stated she does not have a history of PPD.    Chemical Health History: SANTOS stated she has a history of methamphetamine use. She stated she last used July 10th 2021. SANTOS stated that she went to inpatient treatment at [facility] and is continuing outpatient treatment currently.     Current Coping: SANTOS and MARTINA appear to be coping well.     Community Resources//Baby Supplies: SANTOS stated they have all supplies needed for baby.     INTERVENTION:       NORA completed chart review and collaborated with the multidisciplinary team.     Psychosocial Assessment     Introduction to Maternal Child Health  role and scope of practice     Provided \"Meeting Your Basic Needs While Your Child is Hospitalized\" hand out and SW business card     Discussed NICU experience and gave NICU welcome card    Reviewed Hospital and Community Resources     Assessed Chemical Health History and Current Symptoms     Assessed Mental Health History and Current " Symptoms     Identified stressors, barriers and family concerns     Provided support and active empathetic listening and validation.     Provided psychoeducation on  mood and anxiety disorders, assessed for any current symptoms or history    Provided brochure Depression and Anxiety During and after Pregnancy.     ASSESSMENT:     Coping: adequate    Affect: normal    Mood:  calm    Motivation/Ability to Access Services: Independent in accessing services    Assessment of Support System: supportive, involved    Level of engagement with SW: They appeared open to and appreciative of ongoing therapeutic support, advocacy, and connection with resources. Engaged and appropriate. Able to seek out SW when needs arise.     Family s understanding of baby s medical situation: appropriate understanding    Family and parent/infant interactions: Parents seem supportive of each other and are bonding with pt as they are able.     Assessment of parental risk for PMAD: Higher than average risk given substance use history.     Strengths: SANTOS is doing well in recovery and is very open about her history.      Vulnerabilities: SANTOS is still very vulnerable, as she just recently went through substance use treatment.      Identified Barriers: None at this time     PLAN:     SW will continue to follow throughout pt's Maternal-Child Health Journey as needs arise. SW will continue to collaborate with the multidisciplinary team.    HILARIA Betancourt

## 2021-10-22 NOTE — ANESTHESIA PREPROCEDURE EVALUATION
Anesthesia Pre-Procedure Evaluation    Patient: Catarina Crews   MRN: [id number] : 1985        Preoperative Diagnosis: * No surgery found *    Procedure :           Past Medical History:   Diagnosis Date     Depression      Foreign body of elbow, forearm, or wrist, left     BROKEN OFF NEEDLE FROM INJECTING METHAMPHETAMINE     Gastro-oesophageal reflux disease      Hypertension      Migraines      Substance abuse (H)     WENT THROUGH TEEN CHALLENGE      Past Surgical History:   Procedure Laterality Date     REMOVE FOREIGN BODY HAND  2012    Procedure: REMOVE FOREIGN BODY HAND;  LEFT WRIST EXCISION OF FOREIGN BODY;  Surgeon: Eri Mckeon MD;  Location: [facility]     wisdom teeth extraction[        No Known Allergies   Social History     Tobacco Use     Smoking status: Current Every Day Smoker     Types: Vaping Device     Smokeless tobacco: Never Used     Tobacco comment: social   Substance Use Topics     Alcohol use: Not Currently     Comment: a few beers a week      Wt Readings from Last 1 Encounters:   10/21/21 94.3 kg (208 lb)        Anesthesia Evaluation            ROS/MED HX  ENT/Pulmonary:    (-) asthma   Neurologic:  - neg neurologic ROS     Cardiovascular:    (-) PIH   METS/Exercise Tolerance:     Hematologic:     (+) no anticoagulation therapy, no coagulopathy,     Musculoskeletal:       GI/Hepatic:     (+) GERD,     Renal/Genitourinary:       Endo:       Psychiatric/Substance Use:     (+) Recreational drug usage: Meth.    Infectious Disease:       Malignancy:       Other:            Physical Exam    Airway        Mallampati: II   TM distance: > 3 FB   Neck ROM: full     Respiratory Devices and Support         Dental  no notable dental history         Cardiovascular   cardiovascular exam normal          Pulmonary   pulmonary exam normal                OUTSIDE LABS:  CBC:   Lab Results   Component Value Date    WBC 11.6 (H) 10/21/2021    WBC 11.4 (H) 2015    HGB 11.2 (L)  10/21/2021    HGB 10.8 (L) 09/19/2015    HCT 33.9 (L) 10/21/2021    HCT 30.8 (L) 09/19/2015     10/21/2021     10/21/2021     BMP:   Lab Results   Component Value Date     09/11/2015    POTASSIUM 4.3 09/11/2015    CHLORIDE 105 09/11/2015    CO2 24 09/11/2015    BUN 9 09/11/2015    BUN 8 04/29/2010    CR 0.65 10/21/2021    CR 0.67 09/17/2015    GLC 54 (L) 09/11/2015     COAGS: No results found for: PTT, INR, FIBR  POC:   Lab Results   Component Value Date    HCG NEGATIVE 02/25/2013     HEPATIC:   Lab Results   Component Value Date    ALT 29 09/17/2015    AST 28 10/21/2021     OTHER:   Lab Results   Component Value Date    MARIA DE JESUS 8.5 09/11/2015       Anesthesia Plan    ASA Status:  2      Anesthesia Type: Epidural.              Consents    Anesthesia Plan(s) and associated risks, benefits, and realistic alternatives discussed. Questions answered and patient/representative(s) expressed understanding.     - Discussed with:  Patient         Postoperative Care            Comments:    Orders to manage the epidural infusion have been entered, and through coordination with the nurse, we will continute to manage and monitor the patient's labor epidural.  We will continuously be available to adjust as needed thruout the entire L&D process.             Danny Cooper MD

## 2021-10-23 PROCEDURE — 250N000013 HC RX MED GY IP 250 OP 250 PS 637: Performed by: SPECIALIST

## 2021-10-23 PROCEDURE — 120N000012 HC R&B POSTPARTUM

## 2021-10-23 PROCEDURE — 258N000003 HC RX IP 258 OP 636: Performed by: SPECIALIST

## 2021-10-23 RX ORDER — FUROSEMIDE 20 MG
20 TABLET ORAL
Status: DISCONTINUED | OUTPATIENT
Start: 2021-10-23 | End: 2021-10-24 | Stop reason: HOSPADM

## 2021-10-23 RX ADMIN — LABETALOL HYDROCHLORIDE 200 MG: 200 TABLET, FILM COATED ORAL at 06:45

## 2021-10-23 RX ADMIN — ACETAMINOPHEN 650 MG: 325 TABLET, FILM COATED ORAL at 20:13

## 2021-10-23 RX ADMIN — FUROSEMIDE 20 MG: 20 TABLET ORAL at 15:49

## 2021-10-23 RX ADMIN — ACETAMINOPHEN 650 MG: 325 TABLET, FILM COATED ORAL at 09:52

## 2021-10-23 RX ADMIN — ACETAMINOPHEN 650 MG: 325 TABLET, FILM COATED ORAL at 14:15

## 2021-10-23 RX ADMIN — SODIUM CHLORIDE, POTASSIUM CHLORIDE, SODIUM LACTATE AND CALCIUM CHLORIDE 75 ML/HR: 600; 310; 30; 20 INJECTION, SOLUTION INTRAVENOUS at 01:38

## 2021-10-23 RX ADMIN — DOCUSATE SODIUM 100 MG: 100 CAPSULE, LIQUID FILLED ORAL at 07:44

## 2021-10-23 RX ADMIN — ACETAMINOPHEN 650 MG: 325 TABLET, FILM COATED ORAL at 03:58

## 2021-10-23 RX ADMIN — LABETALOL HYDROCHLORIDE 200 MG: 200 TABLET, FILM COATED ORAL at 22:08

## 2021-10-23 RX ADMIN — PANTOPRAZOLE SODIUM 40 MG: 40 TABLET, DELAYED RELEASE ORAL at 06:46

## 2021-10-23 RX ADMIN — IBUPROFEN 800 MG: 400 TABLET ORAL at 20:13

## 2021-10-23 RX ADMIN — ESCITALOPRAM OXALATE 20 MG: 20 TABLET ORAL at 07:44

## 2021-10-23 RX ADMIN — BUPROPION HYDROCHLORIDE 150 MG: 150 TABLET, EXTENDED RELEASE ORAL at 07:44

## 2021-10-23 RX ADMIN — LABETALOL HYDROCHLORIDE 200 MG: 200 TABLET, FILM COATED ORAL at 14:15

## 2021-10-23 RX ADMIN — IBUPROFEN 800 MG: 400 TABLET ORAL at 14:15

## 2021-10-23 RX ADMIN — IBUPROFEN 800 MG: 400 TABLET ORAL at 03:58

## 2021-10-23 RX ADMIN — IBUPROFEN 800 MG: 400 TABLET ORAL at 09:51

## 2021-10-23 ASSESSMENT — ACTIVITIES OF DAILY LIVING (ADL)
ADLS_ACUITY_SCORE: 3

## 2021-10-23 ASSESSMENT — MIFFLIN-ST. JEOR: SCORE: 1559.05

## 2021-10-23 NOTE — PLAN OF CARE
"VSS./64,128/76.& Cont.  Labetalol ,Mag infusing at 2 gm / hr. Denies s/s of headache,epigastric discomfort,blurred vision. Reflexes WNL, Clonus absent. Has 3+ pitting edema Yogi LE's, Voiding 1500 ml + urine.Ibuprofen & tylenol providing adequate pain control for perineal discomfort. Also using ice and tucks pads. Adan. Reg diet.   [place] bottle feeding formula.    BRI TREJO came to me to report a conversation she had with pt, Davis reported to me that that   Pt was crying when BRI TREJO entered room to help pt to bathroom. Stated she was upset because she looking in her bag  & Couldn't find an\" Item\" in her bag that was her husbands and he was mad. (Pt  not here at hospital). CNA asked her what the item was and pt stated a \"  A Syringe\".  When I entered to to check on pt she told me she was upset and crying earlier bcause \"she couldn't remember when she last  fed the baby\" Pt has been tearful tonight and also when  here in room.Pt states she's fine when asked if she's ok.  "

## 2021-10-23 NOTE — PLAN OF CARE
Fundus firm and bleeding wnl.  VSS.  Reflexes brisk, no clonus. 3+ LE edema bilat. Voiding without difficulty.  Taking tylenol and ibuprofen with good relief.  Up independently.  Using ice and tucks. Tearful about not getting d/c'd to home. Support and reassurance given. Encouraged to call with questions or concerns.

## 2021-10-23 NOTE — PLAN OF CARE
VSS on RA.  Fundus firm, midline, U/ U .  Scant lochia rubra.  Pt is able to empty bladder independently.  Voiding adequately.  Up independently.  Pain managed w/Tylenol and Ibuprofen.  No c/o headache, vision changes, or epigastric pain.  +3 BLE edema.  Reflexes brisk, no clonus.  Pt bottle feeding formula (per parents choice).  Bonding well w/infant.  Independent w/infant cares.  Nursing to continue to monitor.

## 2021-10-23 NOTE — PROGRESS NOTES
PPD#1    Preeclampsia with severe features  On labetalol 200 TID  Mag came off at 2 am  Patient sitting up in bed. Not nursing. Feels OK   Leaking some urine    /77   Pulse 52   Temp 97.8  F (36.6  C) (Oral)   Resp 16   Ht 1.524 m (5')   Wt 95.3 kg (210 lb)   SpO2 98%   Breastfeeding No   BMI 41.01 kg/m       Bleeding ok    2+ edema    A/P  Patient who has preeclampsia on medication  Will add lasix and watch BP  Patient disappointed not going home.    Electronically signed by Madie Chinchilla MD 2:47 PM 10/23/2021

## 2021-10-24 VITALS
SYSTOLIC BLOOD PRESSURE: 150 MMHG | HEIGHT: 60 IN | OXYGEN SATURATION: 98 % | TEMPERATURE: 97.5 F | DIASTOLIC BLOOD PRESSURE: 90 MMHG | RESPIRATION RATE: 16 BRPM | BODY MASS INDEX: 41.51 KG/M2 | HEART RATE: 52 BPM | WEIGHT: 211.42 LBS

## 2021-10-24 PROBLEM — O09.523 MULTIGRAVIDA OF ADVANCED MATERNAL AGE IN THIRD TRIMESTER: Status: ACTIVE | Noted: 2021-10-24

## 2021-10-24 PROBLEM — F19.20 CHEMICAL DEPENDENCY (H): Status: ACTIVE | Noted: 2021-10-24

## 2021-10-24 PROCEDURE — 250N000013 HC RX MED GY IP 250 OP 250 PS 637: Performed by: SPECIALIST

## 2021-10-24 RX ORDER — LABETALOL 200 MG/1
200 TABLET, FILM COATED ORAL EVERY 8 HOURS
Qty: 45 TABLET | Refills: 0 | Status: SHIPPED | OUTPATIENT
Start: 2021-10-24 | End: 2022-07-25

## 2021-10-24 RX ADMIN — FUROSEMIDE 20 MG: 20 TABLET ORAL at 08:53

## 2021-10-24 RX ADMIN — ACETAMINOPHEN 650 MG: 325 TABLET, FILM COATED ORAL at 04:15

## 2021-10-24 RX ADMIN — ACETAMINOPHEN 650 MG: 325 TABLET, FILM COATED ORAL at 11:04

## 2021-10-24 RX ADMIN — ESCITALOPRAM OXALATE 20 MG: 20 TABLET ORAL at 08:49

## 2021-10-24 RX ADMIN — BUPROPION HYDROCHLORIDE 150 MG: 150 TABLET, EXTENDED RELEASE ORAL at 09:00

## 2021-10-24 RX ADMIN — IBUPROFEN 800 MG: 400 TABLET ORAL at 11:04

## 2021-10-24 RX ADMIN — PANTOPRAZOLE SODIUM 40 MG: 40 TABLET, DELAYED RELEASE ORAL at 08:49

## 2021-10-24 RX ADMIN — IBUPROFEN 800 MG: 400 TABLET ORAL at 04:14

## 2021-10-24 RX ADMIN — DOCUSATE SODIUM 100 MG: 100 CAPSULE, LIQUID FILLED ORAL at 08:48

## 2021-10-24 ASSESSMENT — ACTIVITIES OF DAILY LIVING (ADL)
ADLS_ACUITY_SCORE: 3

## 2021-10-24 ASSESSMENT — MIFFLIN-ST. JEOR: SCORE: 1565.5

## 2021-10-24 NOTE — PROGRESS NOTES
PP2    No c/o, HA's, vision changes, lightheadedness. Notices swollen legs/feet.  Nursing going well.   had questions about feeding baby and I referred him to RN to discuss.  Currently on labetalol 200 mg tid with BP's in 130s/80s.    BP 93/68   Pulse 61   Temp 97.5  F (36.4  C) (Oral)   Resp 16   Ht 1.524 m (5')   Wt 95.9 kg (211 lb 6.7 oz)   SpO2 98%   Breastfeeding No   BMI 41.29 kg/m     Fundus firm and NT, 2 below U  Ext NT with 1+ pedal edema.    Hgb yesterday 13.1    A/P  Severe preeclampsia, currently on labetalol 200 mg tid with BP's normal in and and 130s/80s in pms.  Will continue this dose but may increase in next few days.  Virtual visit in 2-3 days and at 2 wks.    Postpartum Day 2  Nursing going well, Normal lochia. No problems or concerns.      Electronically signed by:  Bassam Nielson MD, MD   October 24, 2021 8:28 AM  [facility]

## 2021-10-24 NOTE — CONSULTS
"SW: Writer spoke with MOB. Writer asked about the conversation the other day with a CNA regarding a syringe. MOB became visually upset and said she never mentioned a \"syringe\" and feels she is being targeted. When writer explained the note in more detail, she stated that she was talking about an E-cig. FOB then walked in the room and stated it was the E-cig juice.     After talking more with MOB and FOB they were understanding about wanting to clarify the conversation and also stated that things do get mis-communicated. Both parents are sober and SANTOS is currently still in outpatient treatment.     Writer did speak with [facility] to clarify if baby could discharge. They stated that baby could discharge, but that  would need to fax babys tox screen to [phone] when it was finalized. Parents are aware we are still waiting on baby's tox screen, but that baby is able to discharge.     Writer also spoke with MOB regarding her PPD score of 13. She stated she was doing well up until the above conversation. Writer encouraged her to reach out to her counselor and her doctor if she starts to experience PPD.    P: Will continue to follow.    HILARIA Betancourt  "

## 2021-10-24 NOTE — PLAN OF CARE
Vital signs stable. Postpartum assessment WDL. Bilateral lower leg edema ,reflexes +2, no  clonus.  Pain controlled with tylenol and ibuprofen. Patient voiding without difficulty. Bottling similac.Patient and infant bonding well. Will continue with current plan of care.

## 2021-10-24 NOTE — PLAN OF CARE
D: VSS, assessments WDL.   I: Pt. received complete discharge paperwork.  Pt. was given times of last dose for all discharge medications in writing on discharge medication sheets.  Discharge teaching included home medication, pain management, activity restrictions, postpartum cares, and signs and symptoms of infection.    A: Discharge outcomes on care plan met.  Mother states understanding and comfort with self and baby cares.  P: Pt. discharged to home.  Pt. was discharged with baby, and bands were checked at time of discharge.  Pt. was accompanied by , nurse and baby, and left with personal belongings.  Pt. to follow up with OB per MD order.  Pt. had no further questions at the time of discharge and no unmet needs were identified.

## 2021-10-24 NOTE — DISCHARGE SUMMARY
[facility] Discharge Summary    Catarina Crews MRN# [id number]   Age: 36 year old YOB: 1985     Date of Admission:  10/21/2021  Date of Discharge::  10/24/2021  Admitting Physician:  Bia Retana DO  Discharge Physician:  Bassam Nielson MD, MD     Home clinic: [facility]          Admission Diagnoses:     Patient Active Problem List   Diagnosis     Nondependent alcohol abuse     Anxiety state     Attention deficit hyperactivity disorder (ADHD)     Major depressive disorder, recurrent episode (H)     Hypertension complicating pregnancy     Labor and delivery, indication for care     Gestational hypertension w/o significant proteinuria in 3rd trimester     Normal delivery     SGA (small for gestational age)     Preeclampsia     Chemical dependency (H)     Multigravida of advanced maternal age in third trimester               Discharge Diagnosis:   Normal spontaneous vaginal delivery  Intrauterine pregnancy at 37  weeks gestation  Patient Active Problem List   Diagnosis     Nondependent alcohol abuse     Anxiety state     Attention deficit hyperactivity disorder (ADHD)     Major depressive disorder, recurrent episode (H)     Hypertension complicating pregnancy     Labor and delivery, indication for care     Gestational hypertension w/o significant proteinuria in 3rd trimester     Normal delivery     SGA (small for gestational age)     Preeclampsia     Chemical dependency (H)     Multigravida of advanced maternal age in third trimester               Procedures:   Procedure(s): Normal Vaginal Delivery, Epidural       No other procedures performed during this admission           Medications Prior to Admission:     Medications Prior to Admission   Medication Sig Dispense Refill Last Dose     escitalopram (LEXAPRO) 20 MG tablet Take 1 tablet (20 mg) by mouth daily Pt needs visit/PHQ9 assessment for further refills (Patient taking differently: Take 10 mg by mouth daily Pt needs  visit/PHQ9 assessment for further refills) 90 tablet 0 10/21/2021 at Unknown time     omeprazole (PRILOSEC) 20 MG DR capsule Take 20 mg by mouth daily        Prenatal Vit-Fe Fumarate-FA (PRENATAL MULTIVITAMIN W/IRON) 27-0.8 MG tablet Take 1 tablet by mouth daily        BUPROPION HCL PO Take 150 mg by mouth         SUMATRIPTAN SUCCINATE PO                 Discharge Medications:     Current Discharge Medication List      START taking these medications    Details   labetalol (NORMODYNE) 200 MG tablet Take 1 tablet (200 mg) by mouth every 8 hours for 45 doses  Qty: 45 tablet, Refills: 0    Associated Diagnoses: Pre-eclampsia in third trimester         CONTINUE these medications which have NOT CHANGED    Details   escitalopram (LEXAPRO) 20 MG tablet Take 1 tablet (20 mg) by mouth daily Pt needs visit/PHQ9 assessment for further refills  Qty: 90 tablet, Refills: 0    Comments: Has appt 8/19  Must have 90 DS to get $ coverage  Associated Diagnoses: Major depressive disorder, recurrent episode, unspecified      omeprazole (PRILOSEC) 20 MG DR capsule Take 20 mg by mouth daily      Prenatal Vit-Fe Fumarate-FA (PRENATAL MULTIVITAMIN W/IRON) 27-0.8 MG tablet Take 1 tablet by mouth daily      BUPROPION HCL PO Take 150 mg by mouth       SUMATRIPTAN SUCCINATE PO                    Consultations:   No consultations were requested during this admission          Brief History of Labor:   Pt admitted for induction for severe preeclampsia.  Had normal vaginal delivery.           Hospital Course:   The patient's hospital course was unremarkable.  On discharge, her pain was well controlled. Vaginal bleeding is similar to peak menstrual flow.  Voiding without difficulty.  Ambulating well and tolerating a normal diet.  No fever.  Breastfeeding well.  Infant is stable.  No bowel movement yet. She was discharged on post-partum day #2in good condition with stable BP's on labetalol 200 mg tid.      Post-partum hemoglobin:   Hemoglobin   Date  Value Ref Range Status   10/21/2021 11.2 (L) 11.7 - 15.7 g/dL Final   09/19/2015 10.8 (L) 11.7 - 15.7 g/dL Final             Discharge Instructions and Follow-Up:   Discharge diet: Regular   Discharge activity: Activity as tolerated   Discharge follow-up: Follow up with Dr. Barakat in 2-3  Days and at 2 wks with BP values and again at 6 wks in person.   Call if BP >140/80 because you may need dosage adjustment and BP typically worsens in first 5-7 days after delivery and then begins to improve.   Wound care: Drink plenty of fluids  Ice to area for comfort  Keep wound clean and dry  You will need to get BP cuff at drugstore or [employer].  Hospital does not have them.           Discharge Disposition:   Discharged to home      Attestation:  I have reviewed today's vital signs, notes, medications, labs and imaging.  Amount of time performed on this discharge summary: 15 minutes.    Electronically signed by:  Bassam Nielson MD, MD   October 24, 2021 8:56 AM  [facility]

## 2022-01-19 ENCOUNTER — OFFICE VISIT (OUTPATIENT)
Dept: URGENT CARE | Facility: URGENT CARE | Age: 37
End: 2022-01-19
Payer: COMMERCIAL

## 2022-01-19 VITALS
HEART RATE: 62 BPM | TEMPERATURE: 98.3 F | DIASTOLIC BLOOD PRESSURE: 91 MMHG | BODY MASS INDEX: 37.53 KG/M2 | HEIGHT: 61 IN | OXYGEN SATURATION: 97 % | WEIGHT: 198.8 LBS | SYSTOLIC BLOOD PRESSURE: 162 MMHG

## 2022-01-19 DIAGNOSIS — H61.23 BILATERAL IMPACTED CERUMEN: ICD-10-CM

## 2022-01-19 DIAGNOSIS — Z20.822 SUSPECTED 2019 NOVEL CORONAVIRUS INFECTION: Primary | ICD-10-CM

## 2022-01-19 DIAGNOSIS — H65.191 OTHER NON-RECURRENT ACUTE NONSUPPURATIVE OTITIS MEDIA OF RIGHT EAR: ICD-10-CM

## 2022-01-19 DIAGNOSIS — H60.393 INFECTIVE OTITIS EXTERNA, BILATERAL: ICD-10-CM

## 2022-01-19 LAB — SARS-COV-2 RNA RESP QL NAA+PROBE: NORMAL

## 2022-01-19 PROCEDURE — 69209 REMOVE IMPACTED EAR WAX UNI: CPT | Mod: 50 | Performed by: PREVENTIVE MEDICINE

## 2022-01-19 PROCEDURE — 99203 OFFICE O/P NEW LOW 30 MIN: CPT | Mod: 25 | Performed by: PREVENTIVE MEDICINE

## 2022-01-19 PROCEDURE — 99000 SPECIMEN HANDLING OFFICE-LAB: CPT | Performed by: PREVENTIVE MEDICINE

## 2022-01-19 PROCEDURE — U0003 INFECTIOUS AGENT DETECTION BY NUCLEIC ACID (DNA OR RNA); SEVERE ACUTE RESPIRATORY SYNDROME CORONAVIRUS 2 (SARS-COV-2) (CORONAVIRUS DISEASE [COVID-19]), AMPLIFIED PROBE TECHNIQUE, MAKING USE OF HIGH THROUGHPUT TECHNOLOGIES AS DESCRIBED BY CMS-2020-01-R: HCPCS | Mod: 90 | Performed by: PREVENTIVE MEDICINE

## 2022-01-19 PROCEDURE — U0005 INFEC AGEN DETEC AMPLI PROBE: HCPCS | Mod: 90 | Performed by: PREVENTIVE MEDICINE

## 2022-01-19 RX ORDER — BUPROPION HYDROCHLORIDE 150 MG/1
300 TABLET ORAL DAILY
COMMUNITY
Start: 2021-11-30

## 2022-01-19 RX ORDER — HYDROCORTISONE AND ACETIC ACID 20.75; 10.375 MG/ML; MG/ML
5 SOLUTION AURICULAR (OTIC) 3 TIMES DAILY
Qty: 6 ML | Refills: 0 | Status: SHIPPED | OUTPATIENT
Start: 2022-01-19 | End: 2022-01-26

## 2022-01-19 RX ORDER — LISINOPRIL 5 MG/1
TABLET ORAL
COMMUNITY
Start: 2021-12-29 | End: 2022-07-25

## 2022-01-19 ASSESSMENT — MIFFLIN-ST. JEOR: SCORE: 1524.13

## 2022-01-19 NOTE — PROGRESS NOTES
Assessment & Plan     1. Suspected 2019 novel coronavirus infection  - Symptomatic; Unknown COVID-19 Virus (Coronavirus) by PCR Nose  COVID pending  Tylenol as needed    2. Other non-recurrent acute nonsuppurative otitis media of right ear  - amoxicillin-clavulanate (AUGMENTIN) 875-125 MG tablet; Take 1 tablet by mouth 2 times daily for 7 days  Dispense: 14 tablet; Refill: 0  Augmentin for one week    3. Infective otitis externa, bilateral  - acetic acid-hydrocortisone (VOSOL-HC) 1-2 % otic solution; Place 5 drops into both ears 3 times daily for 7 days  Dispense: 6 mL; Refill: 0  Vosol HC for one week    4. Bilateral Impacted Cerumen  Use debrox as needed in the future     Follow up in one week if not improving.    PROCEDURE - MA cleaned ears with warm water to remove cerumen.  Pt tolerated procedure well.  There were no complications.    33 minutes beyond the procedure to remove the wax was spent on the date of the encounter doing chart review, patient visit and documentation     No follow-ups on file.    Kavon Suarez MD  [facility] URGENT CARE    Subjective     Catarina Crews is a 36 year old year old female who presents to clinic today for the following health issues:    Patient presents with:  Otalgia: Right ear pain, Pt think that it's infected.  Cough  Suspected Covid    This is a 35 yo female who has had congestion and right ear pain for 2 days.  Also dry cough.  No fever, sob, cp, rash, sore throat, facial pain.        Patient Active Problem List   Diagnosis     Nondependent alcohol abuse     Anxiety state     Attention deficit hyperactivity disorder (ADHD)     Major depressive disorder, recurrent episode (H)     Hypertension complicating pregnancy     Labor and delivery, indication for care     Gestational hypertension w/o significant proteinuria in 3rd trimester     Normal delivery     SGA (small for gestational age)     Preeclampsia     Chemical dependency (H)      "Multigravida of advanced maternal age in third trimester       Current Outpatient Medications   Medication     acetic acid-hydrocortisone (VOSOL-HC) 1-2 % otic solution     amoxicillin-clavulanate (AUGMENTIN) 875-125 MG tablet     buPROPion (WELLBUTRIN XL) 150 MG 24 hr tablet     escitalopram (LEXAPRO) 20 MG tablet     lisinopril (ZESTRIL) 5 MG tablet     omeprazole (PRILOSEC) 20 MG DR capsule     SUMATRIPTAN SUCCINATE PO     labetalol (NORMODYNE) 200 MG tablet     No current facility-administered medications for this visit.       Past Medical History:   Diagnosis Date     Depression      Foreign body of elbow, forearm, or wrist, left     BROKEN OFF NEEDLE FROM INJECTING METHAMPHETAMINE     Gastro-oesophageal reflux disease      Hypertension      Migraines      Substance abuse (H)     WENT THROUGH TEEN CHALLENGE       Social History   reports that she has been smoking vaping device. She has never used smokeless tobacco. She reports previous alcohol use. She reports that she does not use drugs.    Family History   Problem Relation Age of Onset     Depression Mother      Depression Father      Neurologic Disorder Maternal Grandmother         migraines     Depression Maternal Grandmother      Cancer Maternal Grandfather         lung      Cancer Paternal Grandfather         skin       Review of Systems  Constitutional, HEENT, cardiovascular, pulmonary, GI, , musculoskeletal, neuro, skin, endocrine and psych systems are negative, except as otherwise noted.      Objective    BP (!) 162/91 (BP Location: Left arm, Patient Position: Sitting, Cuff Size: Adult Large)   Pulse 62   Temp 98.3  F (36.8  C) (Tympanic)   Ht 1.549 m (5' 1\")   Wt 90.2 kg (198 lb 12.8 oz)   SpO2 97%   BMI 37.56 kg/m    Physical Exam   GENERAL: healthy, alert and no distress  EYES: Eyes grossly normal to inspection, PERRL and conjunctivae and sclerae normal  HENT: b ear canals obstructed with cerumen and, once clear, are erythematous, L " tm normal, R TM erythematous and bulging, nose and mouth without ulcers or lesions  NECK: no adenopathy, no asymmetry, masses, or scars and thyroid normal to palpation  RESP: lungs clear to auscultation - no rales, rhonchi or wheezes  CV: regular rate and rhythm, normal S1 S2, no S3 or S4, no murmur, click or rub, no peripheral edema and peripheral pulses strong  ABDOMEN: soft, nontender, no hepatosplenomegaly, no masses and bowel sounds normal  MS: no gross musculoskeletal defects noted, no edema  SKIN: no suspicious lesions or rashes  NEURO: Normal strength and tone, mentation intact and speech normal  PSYCH: mentation appears normal, affect normal/bright

## 2022-01-20 LAB — SARS-COV-2 RNA RESP QL NAA+PROBE: NOT DETECTED

## 2022-01-21 ENCOUNTER — TELEPHONE (OUTPATIENT)
Dept: URGENT CARE | Facility: URGENT CARE | Age: 37
End: 2022-01-21
Payer: COMMERCIAL

## 2022-01-21 DIAGNOSIS — H60.393 INFECTIVE OTITIS EXTERNA, BILATERAL: Primary | ICD-10-CM

## 2022-01-21 RX ORDER — NEOMYCIN SULFATE, POLYMYXIN B SULFATE, HYDROCORTISONE 3.5; 10000; 1 MG/ML; [USP'U]/ML; MG/ML
3 SOLUTION/ DROPS AURICULAR (OTIC) 4 TIMES DAILY
Qty: 5 ML | Refills: 0 | Status: SHIPPED | OUTPATIENT
Start: 2022-01-21 | End: 2022-01-28

## 2022-01-21 NOTE — TELEPHONE ENCOUNTER
Plan does not cover this medication. Please call plan at [phone] to initiate prior authorization or call/fax pharmacy to change medication at  [phone]. Patient ID # is [id number].            Artem Pugh Radiology

## 2022-03-04 ENCOUNTER — NURSE TRIAGE (OUTPATIENT)
Dept: NURSING | Facility: CLINIC | Age: 37
End: 2022-03-04
Payer: COMMERCIAL

## 2022-03-04 NOTE — TELEPHONE ENCOUNTER
Pt called stating she broke out rash since yesterday and she would like a refill for her Valtrex 500 mg tonight.       RN paged on call provider Eri Steel, via answering service to call this RN back. No return call received, RN called pt  back and she stated provider called her back.      Seun Patiño RN  [facility]                     Reason for Disposition    [1] Caller has URGENT medication question about med that PCP or specialist prescribed AND [2] triager unable to answer question    Protocols used: MEDICATION QUESTION CALL-A-

## 2022-07-25 ENCOUNTER — OFFICE VISIT (OUTPATIENT)
Dept: URGENT CARE | Facility: URGENT CARE | Age: 37
End: 2022-07-25
Payer: COMMERCIAL

## 2022-07-25 VITALS
WEIGHT: 174.2 LBS | OXYGEN SATURATION: 99 % | BODY MASS INDEX: 32.91 KG/M2 | TEMPERATURE: 98.7 F | DIASTOLIC BLOOD PRESSURE: 89 MMHG | HEART RATE: 77 BPM | SYSTOLIC BLOOD PRESSURE: 144 MMHG

## 2022-07-25 DIAGNOSIS — H92.02 OTALGIA, LEFT: Primary | ICD-10-CM

## 2022-07-25 DIAGNOSIS — F19.20 CHEMICAL DEPENDENCY (H): ICD-10-CM

## 2022-07-25 PROCEDURE — 99213 OFFICE O/P EST LOW 20 MIN: CPT | Performed by: FAMILY MEDICINE

## 2022-07-25 ASSESSMENT — ENCOUNTER SYMPTOMS
NECK PAIN: 0
ENDOCRINE NEGATIVE: 1
ALLERGIC/IMMUNOLOGIC NEGATIVE: 1
SHORTNESS OF BREATH: 0
SORE THROAT: 0
WEAKNESS: 0
MUSCULOSKELETAL NEGATIVE: 1
WOUND: 0
COUGH: 0
DIZZINESS: 0
CARDIOVASCULAR NEGATIVE: 1
DIARRHEA: 0
FEVER: 0
CHILLS: 0
EYES NEGATIVE: 1
NECK STIFFNESS: 0
NAUSEA: 0
BACK PAIN: 0
RHINORRHEA: 0
PALPITATIONS: 0
LIGHT-HEADEDNESS: 0
HEADACHES: 1
VOMITING: 0
RESPIRATORY NEGATIVE: 1
MYALGIAS: 0
JOINT SWELLING: 0
ARTHRALGIAS: 0

## 2022-07-26 NOTE — PROGRESS NOTES
Chief Complaint:    Chief Complaint   Patient presents with     Otitis Media     PAIN IN LEFT EAR, PAIN ON LEFT SIDE OF HEAD/FACE, ALSO MIGRAINE, 3 DAYS, PT HAS BEEN FEELING SICK TO HER STOMACH         ASSESSMENT    Vital signs reviewed by Carlo Keating PA-C  BP (!) 144/89 (BP Location: Left arm, Patient Position: Sitting, Cuff Size: Adult Regular)   Pulse 77   Temp 98.7  F (37.1  C) (Axillary)   Wt 79 kg (174 lb 3.2 oz)   SpO2 99%   BMI 32.91 kg/m       No diagnosis found.     PLAN    Fluids, vaporizer, acetaminophen, and or ibuprofen for pain.  Patient is hypertensive in clinic today.  Second BP reading was also above goal at ***.  Patient instructed to follow up with PCP in the next week for BP recheck.  Sooner if symptoms worsen.  Worrisome symptoms discussed with instructions to go to the ED.  Patient verbalized understanding and agreed with this plan.     LABS:    No results found for any visits on 07/25/22.      Respiratory History  occasional episodes of bronchitis    Current Meds    Current Outpatient Medications:      buPROPion (WELLBUTRIN XL) 150 MG 24 hr tablet, Take 300 mg by mouth daily, Disp: , Rfl:      escitalopram (LEXAPRO) 20 MG tablet, Take 1 tablet (20 mg) by mouth daily Pt needs visit/PHQ9 assessment for further refills (Patient taking differently: Take 10 mg by mouth daily Pt needs visit/PHQ9 assessment for further refills), Disp: 90 tablet, Rfl: 0     omeprazole (PRILOSEC) 20 MG DR capsule, Take 20 mg by mouth daily, Disp: , Rfl:      SUMATRIPTAN SUCCINATE PO, , Disp: , Rfl:      labetalol (NORMODYNE) 200 MG tablet, Take 1 tablet (200 mg) by mouth every 8 hours for 45 doses, Disp: 45 tablet, Rfl: 0     lisinopril (ZESTRIL) 5 MG tablet, TAKE 1 TABLET BY MOUTH DAILY AS DIRECTED (Patient not taking: Reported on 7/25/2022), Disp: , Rfl:     Problem history  Patient Active Problem List   Diagnosis     Nondependent alcohol abuse     Anxiety state     Attention deficit hyperactivity disorder  (ADHD)     Major depressive disorder, recurrent episode (H)     Hypertension complicating pregnancy     Labor and delivery, indication for care     Gestational hypertension w/o significant proteinuria in 3rd trimester     Normal delivery     SGA (small for gestational age)     Preeclampsia     Chemical dependency (H)     Multigravida of advanced maternal age in third trimester       Allergies  No Known Allergies      SUBJECTIVE    HPI:Catarina Crews is an 36 year old female who presents for possible ear infection. Symptoms include ear pain on left and headache. Onset 3 days, unchanged since that time. Ear history: few episodes of otitis.    Patient is eating and drinking well.  No fever, diarrhea or vomiting.  No cough, or wheezing.    ROS:    Review of Systems   Constitutional: Negative for chills and fever.   HENT: Positive for ear pain. Negative for congestion, rhinorrhea and sore throat.    Eyes: Negative.    Respiratory: Negative.  Negative for cough and shortness of breath.    Cardiovascular: Negative.  Negative for chest pain and palpitations.   Gastrointestinal: Negative for diarrhea, nausea and vomiting.   Endocrine: Negative.    Genitourinary: Negative.    Musculoskeletal: Negative.  Negative for arthralgias, back pain, joint swelling, myalgias, neck pain and neck stiffness.   Skin: Negative.  Negative for rash and wound.   Allergic/Immunologic: Negative.  Negative for immunocompromised state.   Neurological: Positive for headaches. Negative for dizziness, weakness and light-headedness.        Family History   Family History   Problem Relation Age of Onset     Depression Mother      Depression Father      Neurologic Disorder Maternal Grandmother         migraines     Depression Maternal Grandmother      Cancer Maternal Grandfather         lung      Cancer Paternal Grandfather         skin        Social History  Social History     Socioeconomic History     Marital status:      Spouse name:  Not on file     Number of children: Not on file     Years of education: Not on file     Highest education level: Not on file   Occupational History     Not on file   Tobacco Use     Smoking status: Current Every Day Smoker     Types: Vaping Device     Smokeless tobacco: Never Used     Tobacco comment: social   Substance and Sexual Activity     Alcohol use: Not Currently     Comment: a few beers a week     Drug use: No     Comment: METHAMPHETAMINE   not currently     Sexual activity: Yes     Partners: Male   Other Topics Concern     Not on file   Social History Narrative     Not on file     Social Determinants of Health     Financial Resource Strain: Low Risk      Difficulty of Paying Living Expenses: Not hard at all   Food Insecurity: No Food Insecurity     Worried About Running Out of Food in the Last Year: Never true     Ran Out of Food in the Last Year: Never true   Transportation Needs: No Transportation Needs     Lack of Transportation (Medical): No     Lack of Transportation (Non-Medical): No   Physical Activity: Inactive     Days of Exercise per Week: 0 days     Minutes of Exercise per Session: 0 min   Stress: Not on file   Social Connections: Not on file   Intimate Partner Violence: Not on file   Housing Stability: Not on file        OBJECTIVE     Physical Exam:     Vital signs reviewed by Carlo Keating PA-C  BP (!) 144/89 (BP Location: Left arm, Patient Position: Sitting, Cuff Size: Adult Regular)   Pulse 77   Temp 98.7  F (37.1  C) (Axillary)   Wt 79 kg (174 lb 3.2 oz)   SpO2 99%   BMI 32.91 kg/m       Physical Exam:    Physical Exam  Vitals and nursing note reviewed.   Constitutional:       General: She is not in acute distress.     Appearance: She is well-developed. She is not ill-appearing, toxic-appearing or diaphoretic.   HENT:      Head: Normocephalic and atraumatic.      Right Ear: Hearing, tympanic membrane, ear canal and external ear normal. Tympanic membrane is not perforated,  erythematous, retracted or bulging.      Left Ear: Hearing, tympanic membrane, ear canal and external ear normal. Tympanic membrane is not perforated, erythematous, retracted or bulging.      Nose: Congestion present. No mucosal edema or rhinorrhea.      Right Sinus: No maxillary sinus tenderness or frontal sinus tenderness.      Left Sinus: No maxillary sinus tenderness or frontal sinus tenderness.      Mouth/Throat:      Pharynx: No pharyngeal swelling, oropharyngeal exudate, posterior oropharyngeal erythema or uvula swelling.      Tonsils: No tonsillar exudate or tonsillar abscesses. 0 on the right. 0 on the left.   Eyes:      General:         Right eye: No discharge.         Left eye: No discharge.      Pupils: Pupils are equal, round, and reactive to light.   Cardiovascular:      Rate and Rhythm: Normal rate and regular rhythm.      Heart sounds: Normal heart sounds. No murmur heard.    No friction rub. No gallop.   Pulmonary:      Effort: Pulmonary effort is normal. No respiratory distress.      Breath sounds: Normal breath sounds. No decreased breath sounds, wheezing, rhonchi or rales.   Chest:      Chest wall: No tenderness.   Abdominal:      General: Bowel sounds are normal. There is no distension.      Palpations: Abdomen is soft. There is no mass.      Tenderness: There is no abdominal tenderness. There is no guarding.   Musculoskeletal:      Cervical back: Normal range of motion and neck supple.   Lymphadenopathy:      Head:      Right side of head: No submental, submandibular, tonsillar, preauricular or posterior auricular adenopathy.      Left side of head: No submental, submandibular, tonsillar, preauricular or posterior auricular adenopathy.      Cervical: No cervical adenopathy.      Right cervical: No superficial or posterior cervical adenopathy.     Left cervical: No superficial or posterior cervical adenopathy.   Skin:     General: Skin is warm and dry.      Findings: No rash.   Neurological:       Mental Status: She is alert and oriented to person, place, and time.      Cranial Nerves: No cranial nerve deficit.      Deep Tendon Reflexes: Reflexes are normal and symmetric.   Psychiatric:         Behavior: Behavior normal. Behavior is cooperative.         Thought Content: Thought content normal.         Judgment: Judgment normal.            Carlo Keating PA-C  7/25/2022, 8:19 PM

## 2022-07-26 NOTE — PROGRESS NOTES
Assessment & Plan     Otalgia, left  I think the pain is jaw related. Maybe this triggered more of a migraine type headache. Use of OTC  meds. Discussed. She also has tryptans which have helped. No alarm features for the headaches.      Chem dependency- in remission now for a month. She is in after care.     [id number]    Return in about 1 week (around 8/1/2022) for follow up with your regular clinic if needed.    Mt Jordan MD  [facility]    ------------------------------------------------------------------------  Subjective     Catarina Crews presents to clinic today for the following health issues:  chief complaint  HPI  Ear pain on the left side. Similar pain in the past when her ear was occluded with wax. No injury nor uri symptoms. The pain seems to have spread some to the temple. She has had some nausea at times. Used tryptan which helps but the headaches returns. Possibly some clenching. Increase in stress related to chem dep treatment. On meds for anxiety/depression. No fever nor uri symptoms. No neuro symptoms.       Review of Systems        Objective    BP (!) 144/89 (BP Location: Left arm, Patient Position: Sitting, Cuff Size: Adult Regular)   Pulse 77   Temp 98.7  F (37.1  C) (Axillary)   Wt 79 kg (174 lb 3.2 oz)   SpO2 99%   BMI 32.91 kg/m    Physical Exam   GENERAL: healthy, alert and no distress  EYES: Eyes grossly normal to inspection  HENT: ear canals and TM's normal, nose and mouth without ulcers or lesions  MS: tmj area has tenderness to palpation. Abnormal jaw opening.   SKIN: acne lesions scattered on her face.   NEURO: Normal strength and tone, sensory exam grossly normal, mentation intact and cranial nerves 2-12 intact  PSYCH: mentation appears normal, affect normal/bright

## 2023-04-29 ENCOUNTER — HOSPITAL ENCOUNTER (EMERGENCY)
Facility: CLINIC | Age: 38
Discharge: HOME | End: 2023-04-29
Attending: EMERGENCY MEDICINE | Admitting: EMERGENCY MEDICINE
Payer: COMMERCIAL

## 2023-04-29 VITALS
RESPIRATION RATE: 16 BRPM | DIASTOLIC BLOOD PRESSURE: 87 MMHG | HEIGHT: 61 IN | WEIGHT: 180 LBS | HEART RATE: 79 BPM | BODY MASS INDEX: 33.99 KG/M2 | TEMPERATURE: 98.4 F | SYSTOLIC BLOOD PRESSURE: 172 MMHG

## 2023-04-29 DIAGNOSIS — L03.211 FACIAL CELLULITIS: ICD-10-CM

## 2023-04-29 PROCEDURE — 99284 EMERGENCY DEPT VISIT MOD MDM: CPT

## 2023-04-29 RX ORDER — DOXYCYCLINE 100 MG/1
100 CAPSULE ORAL 2 TIMES DAILY
Qty: 14 CAPSULE | Refills: 0 | Status: SHIPPED | OUTPATIENT
Start: 2023-04-29 | End: 2023-05-06

## 2023-04-29 RX ORDER — AMOXICILLIN 875 MG
875 TABLET ORAL 2 TIMES DAILY
Qty: 14 TABLET | Refills: 0 | Status: SHIPPED | OUTPATIENT
Start: 2023-04-29 | End: 2023-05-06

## 2023-04-29 ASSESSMENT — ENCOUNTER SYMPTOMS
FACIAL SWELLING: 1
COLOR CHANGE: 1
FEVER: 0

## 2023-04-29 NOTE — DISCHARGE INSTRUCTIONS
Please take antibiotics as prescribed.  I expect that this resulted in significant improvement to your symptoms over the next few days.  If you develop increasing pain redness fever or worsening symptomatology please return to the emergency department for repeat evaluation.  Of note, your blood pressure was elevated in the emergency department.  This should be rechecked through your primary care physician at some point next week to ensure normalization.

## 2023-04-29 NOTE — ED PROVIDER NOTES
EMERGENCY DEPARTMENT ENCOUNTER      NAME: Catarina Crews  AGE: 37 year old female  YOB: 1985  MRN: [id number]  EVALUATION DATE & TIME: No admission date for patient encounter.    PCP: No Ref-Primary, Physician    ED PROVIDER: Justin Beaulieu MD      Chief Complaint   Patient presents with     Facial Swelling     FINAL IMPRESSION:  1. Facial cellulitis      ED COURSE & MEDICAL DECISION MAKIN:40 AM I met with the patient to gather history and perform an initial exam. PPE: gloves. We discussed plans for discharge including supportive cares, symptomatic treatment, outpatient follow up, and reasons to return to the emergency department.    Pertinent Labs & Imaging studies reviewed. (See chart for details)  Very pleasant 37-year-old female presenting to the emergency department for evaluation of facial redness and swelling.  Patient reports past medical history of methamphetamine abuse.  Has recently quit 2 days now sober.  She has had some picking to her skin and an area of her face developed redness pain and swelling.  No drainage.  No history of antibiotic resistant infections per her report.  Patient denies fevers chills or general malaise.  I assessed the patient out in triage due to boarding and capacity issues.  Vital signs notable for hypertension.  She was afebrile.  Did not appear to be in acute distress.  Was not representing severe clinical signs of withdrawal.  She had a facial cellulitis evident on the lower right aspect of the face centered on an area where it appeared there had been picking previously.  No appreciable abscess.  Intraoral examination with no concerning findings.  At this point I did not feel that laboratory testing was indicated.  The patient was comfortable with that plan of care.  I think her exam and history most consistent with a facial cellulitis.  Recommend a course of antibiotics and close monitoring for improvement.  Patient is scheduled to enter therapy and  treatment early next week.  She will be discharged home on antibiotics.  Coverage given overall history for MRSA.  Discussed treatment plan and reviewed return precautions prior to discharge.     Medical Decision Making    History:    Supplemental history from: Documented in chart, if applicable    External Record(s) reviewed: Documented in chart, if applicable.    Work Up:    Chart documentation includes differential considered and any EKGs or imaging independently interpreted by provider, where specified.    In additional to work up documented, I considered the following work up: Documented in chart, if applicable.    External consultation:    Discussion of management with another provider: Documented in chart, if applicable    Complicating factors:    Care impacted by chronic illness: Mental Health and Other: IV drug use    Care affected by social determinants of health: Alcohol Abuse and/or Recreational Drug Use    Disposition considerations: Discharge. I prescribed additional prescription strength medication(s) as charted. See documentation for any additional details.        At the conclusion of the encounter I discussed the results of all of the tests and the disposition. The questions were answered. The patient or family acknowledged understanding and was agreeable with the care plan.       MEDICATIONS GIVEN IN THE EMERGENCY:  Medications - No data to display    NEW PRESCRIPTIONS STARTED AT TODAY'S ER VISIT  New Prescriptions    No medications on file          =================================================================    HPI    Patient information was obtained from: patient     Use of : N/A         Catarina Crews is a 37 year old female with a pertinent history of polysubstance abuse including alcohol and IV methamphetamines, depression, and anxiety, who presents to this ED for evaluation of facial swelling and pain.     The patient developed right lower facial swelling and pain yesterday.  She also has associated redness to the area. Her swelling and firmness has decreased somewhat since yesterday, but she continues to have pain and redness. She also has occasional drainage from the infection site. No associated fevers. She also reports multiple mouth sores, especially on her tongue. Denies any known antibiotic allergies.     She reports a history of IV meth use. She last used about 48 hours ago. She is in the process of trying to quit and she is planning to enter treatment. She is otherwise healthy. No other reported complaints or concerns.       REVIEW OF SYSTEMS   Review of Systems   Constitutional: Negative for fever.   HENT: Positive for facial swelling.         Positive for right facial pain with occasional drainage from infection site. Also positive for mouth sores.    Skin: Positive for color change (redness to right lower face with associated swelling and pain).   All other systems reviewed and are negative.       PAST MEDICAL HISTORY:  Past Medical History:   Diagnosis Date     Depression      Foreign body of elbow, forearm, or wrist, left     BROKEN OFF NEEDLE FROM INJECTING METHAMPHETAMINE     Gastro-oesophageal reflux disease      Hypertension      Migraines      Substance abuse (H)     WENT THROUGH TEEN CHALLENGE       PAST SURGICAL HISTORY:  Past Surgical History:   Procedure Laterality Date     REMOVE FOREIGN BODY HAND  9/14/2012    Procedure: REMOVE FOREIGN BODY HAND;  LEFT WRIST EXCISION OF FOREIGN BODY;  Surgeon: Eri Mckeon MD;  Location: [facility]     wisdom teeth extraction[             CURRENT MEDICATIONS:    buPROPion (WELLBUTRIN XL) 150 MG 24 hr tablet  escitalopram (LEXAPRO) 20 MG tablet  omeprazole (PRILOSEC) 20 MG DR capsule  SUMATRIPTAN SUCCINATE PO        ALLERGIES:  No Known Allergies    FAMILY HISTORY:  Family History   Problem Relation Age of Onset     Depression Mother      Depression Father      Neurologic Disorder Maternal Grandmother         migraines  "    Depression Maternal Grandmother      Cancer Maternal Grandfather         lung      Cancer Paternal Grandfather         skin       SOCIAL HISTORY:   Social History     Socioeconomic History     Marital status:    Tobacco Use     Smoking status: Every Day     Types: Vaping Device     Smokeless tobacco: Never     Tobacco comments:     social   Substance and Sexual Activity     Alcohol use: Not Currently     Comment: a few beers a week     Drug use: No     Comment: METHAMPHETAMINE   not currently     Sexual activity: Yes     Partners: Male     Social Determinants of Health     Financial Resource Strain: Low Risk  (2021)    Overall Financial Resource Strain (CARDIA)      Difficulty of Paying Living Expenses: Not hard at all   Food Insecurity: No Food Insecurity (2021)    Hunger Vital Sign      Worried About Running Out of Food in the Last Year: Never true      Ran Out of Food in the Last Year: Never true   Transportation Needs: No Transportation Needs (2021)    PRAPARE - Transportation      Lack of Transportation (Medical): No      Lack of Transportation (Non-Medical): No   Physical Activity: Inactive (2021)    Exercise Vital Sign      Days of Exercise per Week: 0 days      Minutes of Exercise per Session: 0 min       VITALS:  BP (!) 172/87   Pulse 79   Temp 98.4  F (36.9  C)   Resp 16   Ht 1.549 m (5' 1\")   Wt 81.6 kg (180 lb)   BMI 34.01 kg/m      PHYSICAL EXAM    PHYSICAL EXAM    VITAL SIGNS: BP (!) 172/87   Pulse 79   Temp 98.4  F (36.9  C)   Resp 16   Ht 1.549 m (5' 1\")   Wt 81.6 kg (180 lb)   BMI 34.01 kg/m    Constitutional:  Well developed, well nourished, patient does not appear to be in acute distress.  EYES: Conjunctivae clear, no discharge  HENT: Atraumatic, normocephalic, bilateral external ears normal.  Oropharynx moist. Nose normal.   Neck: Normal ROM , Supple   Respiratory:  No respiratory distress, normal nonlabored respirations.   Cardiovascular:  " Distal perfusion appears intact  Musculoskeletal:  No edema appreciated, Good range of motion in all major joints.   Integument: There is a facial cellulitis noted on the lower right aspect of the face it is tender to palpation.  There is no appreciable abscess or evidence of drainable fluid collection.  Centered on the area of erythema and cellulitis is a small superficial scab likely an area of previous picking.  No lymphadenopathy no meningeal signs no significant intraoral abnormalities appreciated.  Neurologic:  Alert and oriented. No focal deficits noted.  Ambulatory  Psychiatric:  Affect normal, Judgment normal, Mood normal.    I, Sulema Mcelroy, am serving as a scribe to document services personally performed by Justin Beaulieu MD based on my observation and the provider's statements to me. I, Justin Beaulieu MD, attest that Sulema Mcelroy is acting in a scribe capacity, has observed my performance of the services and has documented them in accordance with my direction.    Justin Beaulieu MD  [facility] EMERGENCY ROOM  [address] [facility] [id number] [phone]     Justin Beaulieu MD  04/29/23 124

## 2023-08-13 ENCOUNTER — OFFICE VISIT (OUTPATIENT)
Dept: URGENT CARE | Facility: URGENT CARE | Age: 38
End: 2023-08-13
Payer: COMMERCIAL

## 2023-08-13 VITALS
DIASTOLIC BLOOD PRESSURE: 97 MMHG | HEART RATE: 60 BPM | BODY MASS INDEX: 33.82 KG/M2 | SYSTOLIC BLOOD PRESSURE: 159 MMHG | RESPIRATION RATE: 16 BRPM | WEIGHT: 179 LBS | OXYGEN SATURATION: 99 % | TEMPERATURE: 97.7 F

## 2023-08-13 DIAGNOSIS — Z32.00 PREGNANCY EXAMINATION OR TEST, PREGNANCY UNCONFIRMED: ICD-10-CM

## 2023-08-13 DIAGNOSIS — S81.801A WOUND OF RIGHT LOWER EXTREMITY, INITIAL ENCOUNTER: Primary | ICD-10-CM

## 2023-08-13 LAB — HCG UR QL: NEGATIVE

## 2023-08-13 PROCEDURE — 99212 OFFICE O/P EST SF 10 MIN: CPT | Performed by: PHYSICIAN ASSISTANT

## 2023-08-13 PROCEDURE — 81025 URINE PREGNANCY TEST: CPT | Performed by: PHYSICIAN ASSISTANT

## 2023-08-13 ASSESSMENT — ENCOUNTER SYMPTOMS
ALLERGIC/IMMUNOLOGIC NEGATIVE: 1
JOINT SWELLING: 0
SHORTNESS OF BREATH: 0
NAUSEA: 0
DIZZINESS: 0
WOUND: 1
DIARRHEA: 0
FEVER: 0
CARDIOVASCULAR NEGATIVE: 1
HEADACHES: 0
SORE THROAT: 0
ENDOCRINE NEGATIVE: 1
PALPITATIONS: 0
LIGHT-HEADEDNESS: 0
MYALGIAS: 0
ARTHRALGIAS: 0
WEAKNESS: 0
RESPIRATORY NEGATIVE: 1
RHINORRHEA: 0
NECK PAIN: 0
MUSCULOSKELETAL NEGATIVE: 1
CHILLS: 0
COUGH: 0
EYES NEGATIVE: 1
BACK PAIN: 0
NECK STIFFNESS: 0
VOMITING: 0

## 2023-08-13 NOTE — PROGRESS NOTES
Chief Complaint:    Chief Complaint   Patient presents with    Leg Problem     Infection on right leg from picking at it. Pt is sober from meth for couple of weeks.     Pregnancy Test     IUD in for two years, has not been checked.      Medical Decision Making:    Vital signs reviewed by Carlo Keating PA-C  BP (!) 159/97 (BP Location: Left arm, Patient Position: Sitting, Cuff Size: Adult Regular)   Pulse 60   Temp 97.7  F (36.5  C) (Tympanic)   Resp 16   Wt 81.2 kg (179 lb)   SpO2 99%   BMI 33.82 kg/m         ASSESSMENT:     1. Wound of right lower extremity, initial encounter    2. Pregnancy examination or test, pregnancy unconfirmed           PLAN:     Patient is in no acute distress.  She is afebrile with stable vital signs.  No indication of infection at this time.  Patient will need to stop picking to allow healing to begin and skin to grow back.    Urine pregnancy was negative.    Patient instructed to follow up with PCP in 1 week if symptoms are not improving.  Sooner if symptoms worsen.  Worrisome symptoms discussed with instructions to go to the ED.  Patient verbalized understanding and agreed with this plan.    Labs:     No results found for any visits on 08/13/23.    Current Meds:    Current Outpatient Medications:     buPROPion (WELLBUTRIN XL) 150 MG 24 hr tablet, Take 300 mg by mouth daily, Disp: , Rfl:     escitalopram (LEXAPRO) 20 MG tablet, Take 1 tablet (20 mg) by mouth daily Pt needs visit/PHQ9 assessment for further refills (Patient taking differently: Take 10 mg by mouth daily Pt needs visit/PHQ9 assessment for further refills), Disp: 90 tablet, Rfl: 0    omeprazole (PRILOSEC) 20 MG DR capsule, Take 20 mg by mouth daily, Disp: , Rfl:     SUMATRIPTAN SUCCINATE PO, , Disp: , Rfl:     Allergies:  No Known Allergies    SUBJECTIVE    HPI: Catarina Crews is an 37 year old female who presents for evaluation and treatment of possible infection of the R leg.  Patient has been picking at the leg  for the past several weeks and has noticed some worsening redness and swelling.  Patient is a recovering meth user.  Has not used in 2 weeks.  She would also like a pregnancy test.  She has had an IUD in for 2 years now. She does not recall when her LMP was.      No fever, chills, nausea, vomiting, or dizziness.    ROS:      Review of Systems   Constitutional:  Negative for chills and fever.   HENT:  Negative for congestion, ear pain, rhinorrhea and sore throat.    Eyes: Negative.    Respiratory: Negative.  Negative for cough and shortness of breath.    Cardiovascular: Negative.  Negative for chest pain and palpitations.   Gastrointestinal:  Negative for diarrhea, nausea and vomiting.   Endocrine: Negative.    Genitourinary: Negative.    Musculoskeletal: Negative.  Negative for arthralgias, back pain, joint swelling, myalgias, neck pain and neck stiffness.   Skin:  Positive for wound. Negative for rash.   Allergic/Immunologic: Negative.  Negative for immunocompromised state.   Neurological:  Negative for dizziness, weakness, light-headedness and headaches.        Family History   Family History   Problem Relation Age of Onset    Depression Mother     Depression Father     Neurologic Disorder Maternal Grandmother         migraines    Depression Maternal Grandmother     Cancer Maternal Grandfather         lung     Cancer Paternal Grandfather         skin       Social History  Social History     Socioeconomic History    Marital status:      Spouse name: Not on file    Number of children: Not on file    Years of education: Not on file    Highest education level: Not on file   Occupational History    Not on file   Tobacco Use    Smoking status: Every Day     Types: Vaping Device    Smokeless tobacco: Never    Tobacco comments:     social   Substance and Sexual Activity    Alcohol use: Not Currently     Comment: a few beers a week    Drug use: No     Comment: METHAMPHETAMINE   not currently    Sexual  activity: Yes     Partners: Male   Other Topics Concern    Not on file   Social History Narrative    Not on file     Social Determinants of Health     Financial Resource Strain: Low Risk  (7/30/2021)    Overall Financial Resource Strain (CARDIA)     Difficulty of Paying Living Expenses: Not hard at all   Food Insecurity: No Food Insecurity (7/30/2021)    Hunger Vital Sign     Worried About Running Out of Food in the Last Year: Never true     Ran Out of Food in the Last Year: Never true   Transportation Needs: No Transportation Needs (7/30/2021)    PRAPARE - Transportation     Lack of Transportation (Medical): No     Lack of Transportation (Non-Medical): No   Physical Activity: Inactive (7/30/2021)    Exercise Vital Sign     Days of Exercise per Week: 0 days     Minutes of Exercise per Session: 0 min   Stress: Not on file   Social Connections: Not on file   Intimate Partner Violence: Not on file   Housing Stability: Not on file        Surgical History:  Past Surgical History:   Procedure Laterality Date    REMOVE FOREIGN BODY HAND  9/14/2012    Procedure: REMOVE FOREIGN BODY HAND;  LEFT WRIST EXCISION OF FOREIGN BODY;  Surgeon: Eri Mckeon MD;  Location: [facility]    wisdom teeth extraction[          Problem List:  Patient Active Problem List   Diagnosis    Nondependent alcohol abuse    Anxiety state    Attention deficit hyperactivity disorder (ADHD)    Major depressive disorder, recurrent episode (H)    Hypertension complicating pregnancy    Labor and delivery, indication for care    Gestational hypertension w/o significant proteinuria in 3rd trimester    Normal delivery    SGA (small for gestational age)    Preeclampsia    Chemical dependency (H)    Multigravida of advanced maternal age in third trimester           OBJECTIVE:     Vital signs noted and reviewed by Carlo Keating PA-C  BP (!) 159/97 (BP Location: Left arm, Patient Position: Sitting, Cuff Size: Adult Regular)   Pulse 60   Temp 97.7  F  (36.5  C) (Tympanic)   Resp 16   Wt 81.2 kg (179 lb)   SpO2 99%   BMI 33.82 kg/m       PEFR:    Physical Exam  Vitals and nursing note reviewed.   Constitutional:       General: She is not in acute distress.     Appearance: She is well-developed. She is not ill-appearing, toxic-appearing or diaphoretic.   HENT:      Head: Normocephalic and atraumatic.      Right Ear: Tympanic membrane and external ear normal. No drainage, swelling or tenderness. Tympanic membrane is not perforated, erythematous, retracted or bulging.      Left Ear: Tympanic membrane and external ear normal. No drainage, swelling or tenderness. Tympanic membrane is not perforated, erythematous, retracted or bulging.      Nose: No mucosal edema, congestion or rhinorrhea.      Right Sinus: No maxillary sinus tenderness or frontal sinus tenderness.      Left Sinus: No maxillary sinus tenderness or frontal sinus tenderness.      Mouth/Throat:      Pharynx: No pharyngeal swelling, oropharyngeal exudate, posterior oropharyngeal erythema or uvula swelling.      Tonsils: No tonsillar abscesses.   Eyes:      Pupils: Pupils are equal, round, and reactive to light.   Neck:      Trachea: Trachea normal.   Cardiovascular:      Rate and Rhythm: Normal rate and regular rhythm.      Heart sounds: Normal heart sounds, S1 normal and S2 normal. No murmur heard.     No friction rub. No gallop.   Pulmonary:      Effort: Pulmonary effort is normal. No respiratory distress.      Breath sounds: Normal breath sounds. No decreased breath sounds, wheezing, rhonchi or rales.   Abdominal:      General: Bowel sounds are normal. There is no distension.      Palpations: Abdomen is soft. Abdomen is not rigid. There is no mass.      Tenderness: There is no abdominal tenderness. There is no guarding or rebound.   Musculoskeletal:      Cervical back: Full passive range of motion without pain, normal range of motion and neck supple.      Right upper leg: No swelling, edema,  deformity, tenderness or bony tenderness.        Legs:       Comments: Patient has roughly 3 cm in diameter wound with partial skin loss.  No erythema, swelling, or discharge at this time.     Lymphadenopathy:      Cervical: No cervical adenopathy.   Skin:     General: Skin is warm and dry.   Neurological:      Mental Status: She is alert and oriented to person, place, and time.      Cranial Nerves: No cranial nerve deficit.      Deep Tendon Reflexes: Reflexes are normal and symmetric.   Psychiatric:         Behavior: Behavior normal. Behavior is cooperative.         Thought Content: Thought content normal.         Judgment: Judgment normal.             Carlo Keating PA-C  8/13/2023, 10:56 AM

## 2023-09-11 NOTE — PLAN OF CARE
Catarina admitted into 212 for induction of labor for preeclampsia without severe features. External fetal monitors and Lane applied with pt consent. FHTs with baseline HR 135s, moderate variability, +accels, intermittent variable. AVSS except BPs elevated, procardia given. Pt denies contractions, leaking of fluid or vaginal bleeding. Active fetal movement. Urine drug screening sent with pt consent. Plan for vaginal cytotec for cervical ripening, Penicillin for +GBS status, Epidural for labor pain.    No assistance needed

## 2024-10-14 ENCOUNTER — OFFICE VISIT (OUTPATIENT)
Dept: FAMILY MEDICINE | Facility: CLINIC | Age: 39
End: 2024-10-14
Payer: COMMERCIAL

## 2024-10-14 VITALS
OXYGEN SATURATION: 95 % | DIASTOLIC BLOOD PRESSURE: 97 MMHG | SYSTOLIC BLOOD PRESSURE: 166 MMHG | TEMPERATURE: 98.1 F | HEART RATE: 68 BPM | RESPIRATION RATE: 18 BRPM

## 2024-10-14 DIAGNOSIS — J06.9 UPPER RESPIRATORY TRACT INFECTION, UNSPECIFIED TYPE: Primary | ICD-10-CM

## 2024-10-14 LAB — SARS-COV-2 RNA RESP QL NAA+PROBE: NEGATIVE

## 2024-10-14 PROCEDURE — 99213 OFFICE O/P EST LOW 20 MIN: CPT | Performed by: PHYSICIAN ASSISTANT

## 2024-10-14 PROCEDURE — 87635 SARS-COV-2 COVID-19 AMP PRB: CPT | Performed by: PHYSICIAN ASSISTANT

## 2024-10-14 RX ORDER — OXYMETAZOLINE HYDROCHLORIDE 0.05 G/100ML
2 SPRAY NASAL 2 TIMES DAILY
Qty: 2 ML | Refills: 0 | Status: SHIPPED | OUTPATIENT
Start: 2024-10-14 | End: 2024-10-17

## 2024-10-14 RX ORDER — BENZONATATE 100 MG/1
100 CAPSULE ORAL 3 TIMES DAILY PRN
Qty: 30 CAPSULE | Refills: 0 | Status: SHIPPED | OUTPATIENT
Start: 2024-10-14

## 2024-10-14 RX ORDER — FLUTICASONE PROPIONATE 50 MCG
SPRAY, SUSPENSION (ML) NASAL
Qty: 16 G | Refills: 0 | Status: SHIPPED | OUTPATIENT
Start: 2024-10-14 | End: 2024-11-11

## 2024-10-14 ASSESSMENT — ENCOUNTER SYMPTOMS
SINUS PAIN: 0
CHEST TIGHTNESS: 1
WHEEZING: 0
EYE PAIN: 0
EYE DISCHARGE: 1
EYE ITCHING: 1
EYE REDNESS: 1
SORE THROAT: 1
RHINORRHEA: 1
COUGH: 1
SINUS PRESSURE: 0
SHORTNESS OF BREATH: 1

## 2024-10-14 NOTE — PROGRESS NOTES
Assessment & Plan        1. Upper respiratory tract infection, unspecified type    -Viral illness  -Patient instructed on using the saline nasal rinses for cleaning the nasal passages followed by Flonase. Afrin recommended for a maximum of 3 days. Cough suppressant at night, Mucinex during the day.   - Symptomatic COVID-19 Virus (Coronavirus) by PCR Nose  - sodium chloride (OCEAN) 0.65 % nasal spray; Spray 2 sprays in nostril 2 times daily.  - fluticasone (FLONASE) 50 MCG/ACT nasal spray; Spray 2 sprays into both nostrils 2 times daily for 14 days, THEN 1 spray 2 times daily for 14 days.  Dispense: 16 g; Refill: 0  - oxymetazoline (AFRIN) 0.05 % nasal spray; Spray 0.2 mLs (2 sprays) into both nostrils 2 times daily for 3 days.  Dispense: 2 mL; Refill: 0  - benzonatate (TESSALON) 100 MG capsule; Take 1 capsule (100 mg) by mouth 3 times daily as needed for cough.  Dispense: 30 capsule; Refill: 0      Patient Instructions   Cough  For productive cough I suggest using over the counter medications such as guaifenesin (Mucinex). Mucinex will reduce the viscosity of mucus secretions and help with productive cough. Drink plenty of fluids while on Mucinex.   You can also take the cough suppressant I prescribed, tessalon perles at night to suppress the cough/ Follow up in clinic if symptoms persist or worsen. Cough can linger for weeks    URI  For congestion, clean your nasal passages with saline, then use flonase. If still feeling congestion, try Afrin twice a day for maximum of 3 days. Do not use Afrin for more than 3 days.     Follow up in the clinic or via E visit or Virtual visit if you start experiencing sinus pain or pressure.    Return if symptoms worsen or fail to improve, for Follow up, 7 days.    At the end of the encounter, I discussed results, diagnosis, medications. Discussed red flags for immediate return to clinic/ER, as well as indications for follow up if no improvement. Patient understood and agreed to  plan. Patient was stable for discharge.    Sabine Elmore is a 39 year old female who presents to clinic today  for the following health issues:  Chief Complaint   Patient presents with    Cough     Cough with mucus nasal congestion eyes crusty      HPI    Patient reports cold and cough symptoms x 12 days which resolved and recurred in the past 2 days. She notes congestion, runny nose, productive cough and bilateral eye mattering. She was seen 12 days ago at another Urgent care clinic - COVID-19, Flu, RSV came back negative. She has been taking OTC cold and cough medication, analgesics.  No fever, chills, chest pain, sinus pain or pressure.    Review of Systems   HENT:  Positive for congestion, rhinorrhea and sore throat. Negative for sinus pressure and sinus pain.    Eyes:  Positive for discharge, redness and itching. Negative for pain.   Respiratory:  Positive for cough, chest tightness and shortness of breath. Negative for wheezing.    Cardiovascular:  Negative for chest pain.       Problem List:  2021-10: Chemical dependency (H)  2021-10: Multigravida of advanced maternal age in third trimester  2021-10: Preeclampsia  2015-09: Normal delivery  2015-09: SGA (small for gestational age)  2015-09: Gestational hypertension w/o significant proteinuria in 3rd   trimester  2015-09: Labor and delivery, indication for care  2015-09: Hypertension complicating pregnancy  2013-03: Supervision of normal first pregnancy  2013-02: Nondependent alcohol abuse  2013-02: Anxiety state  2013-02: Attention deficit hyperactivity disorder (ADHD)  2013-02: Major depressive disorder, recurrent episode (H)      Past Medical History:   Diagnosis Date    Depression     Foreign body of elbow, forearm, or wrist, left     BROKEN OFF NEEDLE FROM INJECTING METHAMPHETAMINE    Gastro-oesophageal reflux disease     Hypertension     Migraines     Substance abuse (H)     WENT THROUGH [facility]       Social History     Tobacco Use     Smoking status: Every Day     Types: Vaping Device    Smokeless tobacco: Never    Tobacco comments:     social   Substance Use Topics    Alcohol use: Not Currently     Comment: a few beers a week           Objective    BP (!) 166/97   Pulse 68   Temp 98.1  F (36.7  C) (Oral)   Resp 18   SpO2 95%   Physical Exam  Constitutional:       Appearance: Normal appearance.   HENT:      Head: Normocephalic.      Right Ear: Tympanic membrane normal.      Left Ear: Tympanic membrane normal.      Nose: Congestion present.      Right Sinus: No maxillary sinus tenderness or frontal sinus tenderness.      Left Sinus: No maxillary sinus tenderness or frontal sinus tenderness.      Mouth/Throat:      Mouth: Mucous membranes are moist.      Pharynx: Oropharynx is clear. Uvula midline. No posterior oropharyngeal erythema.   Eyes:      General: Lids are normal.      Conjunctiva/sclera:      Right eye: Right conjunctiva is not injected.      Left eye: Left conjunctiva is not injected.   Cardiovascular:      Rate and Rhythm: Normal rate and regular rhythm.   Pulmonary:      Effort: Pulmonary effort is normal.      Breath sounds: Normal breath sounds.   Lymphadenopathy:      Head:      Right side of head: No submental, submandibular or tonsillar adenopathy.      Left side of head: No submental, submandibular or tonsillar adenopathy.      Cervical: No cervical adenopathy.      Right cervical: No superficial cervical adenopathy.     Left cervical: No superficial cervical adenopathy.   Skin:     General: Skin is warm and dry.      Findings: No rash.   Neurological:      Mental Status: She is alert.   Psychiatric:         Mood and Affect: Mood normal.         Behavior: Behavior normal.              Soledad Farrell PA-C

## 2024-10-14 NOTE — PATIENT INSTRUCTIONS
Cough  For productive cough I suggest using over the counter medications such as guaifenesin (Mucinex). Mucinex will reduce the viscosity of mucus secretions and help with productive cough. Drink plenty of fluids while on Mucinex.   You can also take the cough suppressant I prescribed, tessalon perles at night to suppress the cough/ Follow up in clinic if symptoms persist or worsen. Cough can linger for weeks    URI  For congestion, clean your nasal passages with saline, then use flonase. If still feeling congestion, try Afrin twice a day for maximum of 3 days. Do not use Afrin for more than 3 days.     Follow up in the clinic or via E visit or Virtual visit if you start experiencing sinus pain or pressure.